# Patient Record
Sex: MALE | Race: WHITE | NOT HISPANIC OR LATINO | Employment: FULL TIME
[De-identification: names, ages, dates, MRNs, and addresses within clinical notes are randomized per-mention and may not be internally consistent; named-entity substitution may affect disease eponyms.]

---
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
## 2021-05-31 VITALS — WEIGHT: 171.74 LBS | HEIGHT: 71 IN | BODY MASS INDEX: 24.04 KG/M2

## 2021-06-12 NOTE — PROGRESS NOTES
Roomed by: Ines     Accompanied by Mother        Vitals:    08/22/17 1355   BP: 120/60   Pulse: 69   Temp: 97.2  F (36.2  C)       Chief Complaint   Patient presents with     Concerns     Possible ADD        HPI:    Mother says that father was diagnosed with ADD in the eighth grade.  He has tried numerous medications.  Last border they tried several different medications.  Medications may work for a while and then they stop.    He saw Dr. Baker who then referred him here.    He will be a senior this fall.    He was on Concerta for quite a while.  He says he does not think that it ever worked up.  Then he was on Adderall for a long time.  It worked for a long time but he turned purple when he got a cold and had problems with weight loss.    He also was on fluoxetine.  Then on Lexapro.  One made him be pissed off all the time so it was stopped.  There were concerns for depression.    When he was on fluoxetine he was also on other medications.  The other medication made him have a short fuse.    Dr. Mejia it has been the person prescribing medications.    The Adderall let him be focused.  After year he had weird side effects.    He has had issues with depression in the last couple of years.  He has been worrying too much.  He used to lock all the doors in the house and unplug all of the electrical cords.  He fell down about.  Fell for parent.    He was on fluoxetine for 1 month and stopped because of summer.    He said issues with anxiety and stress.    His grades were good but in the last quarter they were very bad.    He has taken one ACT test .  He was unable to finish it secondary to distraction.    If he is not on medication he does not have any problems with depression.    His anxiety was better when he was on the fluoxetine.    He has problems with anxiety if he has a lot of things that he needs to do or if he is out in the woods.    He only does homework when he has it between the hours of 11:30 PM and 2  AM.    He stopped Adderall during spring break.  He stopped it because he turned blue with a cold.  He did not eat.  He had a dry mouth.  He felt depressed.    He says he tried to more medications before being on fluoxetine.    He tried Vyvanse and his skin did not turn color.    On the fluoxetine his anxiety went away.  However, he could not pay attention.    He does not think that he has ever been on guanfacine.    Mother thinks the progression of medications was from Adderall and then to Vyvanse and then to Lexapro and then fluoxetine.    During this time his grades declined.  He got behind in his homework and tried to catch up.    While on the fluoxetine he was on another medication.  Not sure the name.  He was on this other medicine for 3 days.    His diagnosis of ADHD was made through a psychologist in eighth grade.  Mother does not know the name of the psychologist.        Past medical history: No serious illnesses.    Social history: Family unit consists of mother, father and a 16-year-old brother.  Ortiz was breaking up with someone in the left [employer] school.  He says however this only bothered him for a couple of days.    Family history: Mother and paternal aunt have attention problems.  No family history of heart rhythm problems or pacemaker.    Except for mentioned above complete review of systems is negative.    DSM-V criteria for ADHD were reviewed.   All yes answers in the area of inattention.  4 yes answers in the area of hyperactivity and impulsivity.    PHQ 9 score is 16    Hebert 7 score is 16          ================================    Physical Exam:    General Appearance:   Alert, NAD            Assessment:    1. ADHD (attention deficit hyperactivity disorder), inattentive type        Plan: See Patient Instructions.    No medications were ordered this encounter      Patient Instructions   As we got to the end of the visit it was not possible to tell exactly which medications he was on at which  time and caused which problems.    Mother asked if I would like to see his records from Dr. James and I told her I definitely would.    She said that she will take care of getting the records sent to the office.    Once I review these records then I will get back with her and discuss possibilities for treatment.    I spent 45 minutes with the patient and parent.  Greater than 50% spent in counseling.

## 2021-06-12 NOTE — PROGRESS NOTES
This will be a summary of the clinic note from his visit at [facility].    April 21, 2017: He had been on fluoxetine but did not feel like it was helpful.  His mood felt his mood was better within 4-5 days but was unable to focus or concentrate at school so stopped it.  He restarted Adderall.  He had problems with poor appetite.  Did not feel like it helped with his attention.  Fluoxetine was restarted for his mood.  He was started on Focalin 10 mg daily.    April 5, 2017: Phone conversation.  Medication not helpful.  He was restarted on Adderall.    March 2, 2017: Long-standing signs and symptoms of depression, anxiety.  Have worsened recently.  Grades have declined due to low mood.  Difficulty concentrating.  Distractibility.   Ortiz reported feeling anxious, worrying, restless, feeling down.  Angry outbursts, yells, swears, break things.  He had tried citalopram and sertraline in the past.  He felt ill on the medication.  Fluoxetine had been prescribed but he never took it regularly.  He was treated with trazodone for sleep.  Fluoxetine was also prescribed.    January 30, 2017:   He had been on albuterol until the last month when he was switched to Concerta.  He did not like how he felt when he is on the Adderall, said it made him feel tired.    He tried Concerta 36 mg twice daily with that it did not help, did not notice any improvement in ADD symptoms.  He was switched to Vyvanse.  Ritalin tablets in the afternoon when needed.      December 7, 2016:   On Adderall X are 20 mg daily.  He reported the Adderall helps him focus but he does not like taking it.  It makes him more tired.  From father wanted to try Concerta again it was prescribed.  Concerta 36 mg, 1 tablet 2 times a day.    February 5, 2016.   Had switched to Adderall in December and was noted to be doing well.  He had less side effects than with conservative.  Continues to have anxiety.  He had tried Celexa for a few days but it made him  too tired so he did not continue on it.  He was started on Zoloft to see if this would help with his anxiety.    December 30, 2015.   He was taking Celexa 10 mg tablets and Concerta 36 mg tablets.  Adderall dose was increased.    September 14, 2015:   Concerned with anxiety and mood swings and anger outbursts.  He did not feel any different off of the Concerta, anxiety stayed the same.  Did not feel the Concerta was helpful.  He was started on Celexa.  Continue on Concerta.    March 18, 2015:   Taking Concerta 36 mg daily.  It helps with attention and focus.  Wears off around 2 PM.  He does homework after school or in the evening.  He struggles to get homework done sometimes.  He feels okay on the medicine.  ================================================      Review of the outside records shows that he has never been on guanfacine.    At one time or another he has been on Concerta, Adderall, Focalin, Celexa, Vyvanse, fluoxetine, trazodone for sleep, sertraline.

## 2021-06-16 PROBLEM — F90.0 ADHD (ATTENTION DEFICIT HYPERACTIVITY DISORDER), INATTENTIVE TYPE: Status: ACTIVE | Noted: 2017-09-10

## 2021-10-11 ENCOUNTER — APPOINTMENT (OUTPATIENT)
Dept: URBAN - METROPOLITAN AREA CLINIC 253 | Age: 21
Setting detail: DERMATOLOGY
End: 2021-10-14

## 2021-10-11 VITALS — HEIGHT: 71 IN | WEIGHT: 173 LBS | RESPIRATION RATE: 14 BRPM

## 2021-10-11 DIAGNOSIS — L65.9 NONSCARRING HAIR LOSS, UNSPECIFIED: ICD-10-CM

## 2021-10-11 PROCEDURE — OTHER VENIPUNCTURE: OTHER

## 2021-10-11 PROCEDURE — OTHER COUNSELING: OTHER

## 2021-10-11 PROCEDURE — 99202 OFFICE O/P NEW SF 15 MIN: CPT | Mod: 25

## 2021-10-11 PROCEDURE — OTHER ADDITIONAL NOTES: OTHER

## 2021-10-11 PROCEDURE — 36415 COLL VENOUS BLD VENIPUNCTURE: CPT

## 2021-10-11 PROCEDURE — OTHER ORDER TESTS: OTHER

## 2021-10-11 ASSESSMENT — LOCATION SIMPLE DESCRIPTION DERM: LOCATION SIMPLE: POSTERIOR SCALP

## 2021-10-11 ASSESSMENT — LOCATION ZONE DERM: LOCATION ZONE: SCALP

## 2021-10-11 ASSESSMENT — LOCATION DETAILED DESCRIPTION DERM: LOCATION DETAILED: POSTERIOR MID-PARIETAL SCALP

## 2021-10-11 NOTE — PROCEDURE: VENIPUNCTURE
Venipuncture Paragraph: An alcohol pad was applied to the venipuncture site. Venipuncture was performed using a butterfly needle. Pressure and a bandaid was applied to the site. No complications were noted.
Detail Level: None
Bill 38137 For Specimen Handling/Conveyance To Laboratory?: no
Number Of Tubes Drawn: 1

## 2021-10-11 NOTE — PROCEDURE: ADDITIONAL NOTES
Render Risk Assessment In Note?: no
Detail Level: Zone
Additional Notes: Discussed telogen effluvium due to significant weight loss vs androgenic alopecia. Briefly discussed propecia \\nPt has had iron, CBC, and iron checked, unsure about Ferritin. Pt will sign release of records form and will have the clinic fax over his lab results and will check ferritin today. May consider propecia if labs are WNL

## 2021-10-11 NOTE — PROCEDURE: ORDER TESTS
Bill For Surgical Tray: no
Unknown; pt is an unreliable historian and no family/caregivers present
Billing Type: Third-Party Bill
Expected Date Of Service: 10/11/2021

## 2021-10-11 NOTE — HPI: HAIR LOSS
How Did The Hair Loss Occur?: sudden in onset
Additional History: He has noticed more loss in the shower with shampooing. \\n\\nHe has lost 38 pounds within the 4 months. He used a keto diet for a while and not maintains a high protein diet. He has been exercising 6 days per week. \\n\\nNo recent illness. He has experienced a high amount of stress and anxiety over the years. He does take an anti anxiety medication, but stopped about a year ago. He has been taking medication for anxiety for 4 years. \\n\\nHe had lab work drawn about a week ago, his thyroid, blood counts were normal. They are not sure if his iron or ferritin levels were checked.\\n\\nHis grandpa and uncle on his Dad’s side of the family have thinning hair.

## 2021-10-13 ENCOUNTER — RX ONLY (RX ONLY)
Age: 21
End: 2021-10-13

## 2021-10-13 RX ORDER — FINASTERIDE 5 MG/1
TABLET, FILM COATED ORAL
Qty: 24 | Refills: 0 | Status: CANCELLED | COMMUNITY
Start: 2021-10-13

## 2021-10-14 ENCOUNTER — RX ONLY (RX ONLY)
Age: 21
End: 2021-10-14

## 2021-10-14 RX ORDER — FINASTERIDE 5 MG/1
TABLET, FILM COATED ORAL
Qty: 24 | Refills: 0 | Status: ERX

## 2022-06-02 ENCOUNTER — LAB REQUISITION (OUTPATIENT)
Dept: LAB | Facility: CLINIC | Age: 22
End: 2022-06-02
Payer: COMMERCIAL

## 2022-06-02 DIAGNOSIS — R10.9 UNSPECIFIED ABDOMINAL PAIN: ICD-10-CM

## 2022-06-02 LAB
ANION GAP SERPL CALCULATED.3IONS-SCNC: 10 MMOL/L (ref 5–18)
BUN SERPL-MCNC: 18 MG/DL (ref 8–22)
CALCIUM SERPL-MCNC: 9.5 MG/DL (ref 8.5–10.5)
CHLORIDE BLD-SCNC: 104 MMOL/L (ref 98–107)
CO2 SERPL-SCNC: 26 MMOL/L (ref 22–31)
CREAT SERPL-MCNC: 1.11 MG/DL (ref 0.7–1.3)
GFR SERPL CREATININE-BSD FRML MDRD: >90 ML/MIN/1.73M2
GLUCOSE BLD-MCNC: 87 MG/DL (ref 70–125)
POTASSIUM BLD-SCNC: 4.5 MMOL/L (ref 3.5–5)
SODIUM SERPL-SCNC: 140 MMOL/L (ref 136–145)

## 2022-06-02 PROCEDURE — 80048 BASIC METABOLIC PNL TOTAL CA: CPT | Mod: ORL | Performed by: FAMILY MEDICINE

## 2022-10-17 ENCOUNTER — LAB REQUISITION (OUTPATIENT)
Dept: LAB | Facility: CLINIC | Age: 22
End: 2022-10-17
Payer: COMMERCIAL

## 2022-10-17 DIAGNOSIS — Z13.220 ENCOUNTER FOR SCREENING FOR LIPOID DISORDERS: ICD-10-CM

## 2022-10-17 DIAGNOSIS — N52.9 MALE ERECTILE DYSFUNCTION, UNSPECIFIED: ICD-10-CM

## 2022-10-17 PROCEDURE — 84443 ASSAY THYROID STIM HORMONE: CPT | Mod: ORL | Performed by: FAMILY MEDICINE

## 2022-10-17 PROCEDURE — 84403 ASSAY OF TOTAL TESTOSTERONE: CPT | Mod: ORL | Performed by: FAMILY MEDICINE

## 2022-10-17 PROCEDURE — 80061 LIPID PANEL: CPT | Mod: ORL | Performed by: FAMILY MEDICINE

## 2022-10-17 PROCEDURE — 84270 ASSAY OF SEX HORMONE GLOBUL: CPT | Mod: ORL | Performed by: FAMILY MEDICINE

## 2022-10-17 PROCEDURE — 80053 COMPREHEN METABOLIC PANEL: CPT | Mod: ORL | Performed by: FAMILY MEDICINE

## 2022-10-18 LAB — TSH SERPL DL<=0.005 MIU/L-ACNC: 1.38 UIU/ML (ref 0.3–4.2)

## 2022-10-19 LAB
ALBUMIN SERPL BCG-MCNC: 4.9 G/DL (ref 3.5–5.2)
ALP SERPL-CCNC: 76 U/L (ref 40–129)
ALT SERPL W P-5'-P-CCNC: 33 U/L (ref 10–50)
ANION GAP SERPL CALCULATED.3IONS-SCNC: 17 MMOL/L (ref 7–15)
AST SERPL W P-5'-P-CCNC: 24 U/L (ref 10–50)
BILIRUB SERPL-MCNC: 0.3 MG/DL
BUN SERPL-MCNC: 22.8 MG/DL (ref 6–20)
CALCIUM SERPL-MCNC: 10.1 MG/DL (ref 8.6–10)
CHLORIDE SERPL-SCNC: 101 MMOL/L (ref 98–107)
CHOLEST SERPL-MCNC: 185 MG/DL
CREAT SERPL-MCNC: 1.18 MG/DL (ref 0.67–1.17)
DEPRECATED HCO3 PLAS-SCNC: 21 MMOL/L (ref 22–29)
GFR SERPL CREATININE-BSD FRML MDRD: 89 ML/MIN/1.73M2
GLUCOSE SERPL-MCNC: 90 MG/DL (ref 70–99)
HDLC SERPL-MCNC: 83 MG/DL
LDLC SERPL CALC-MCNC: 83 MG/DL
NONHDLC SERPL-MCNC: 102 MG/DL
POTASSIUM SERPL-SCNC: 4.5 MMOL/L (ref 3.4–5.3)
PROT SERPL-MCNC: 7.4 G/DL (ref 6.4–8.3)
SHBG SERPL-SCNC: 45 NMOL/L (ref 11–80)
SODIUM SERPL-SCNC: 139 MMOL/L (ref 136–145)
TRIGL SERPL-MCNC: 95 MG/DL

## 2022-10-20 LAB
TESTOST FREE SERPL-MCNC: 10.47 NG/DL
TESTOST SERPL-MCNC: 591 NG/DL (ref 240–950)

## 2022-10-25 ENCOUNTER — LAB REQUISITION (OUTPATIENT)
Dept: LAB | Facility: CLINIC | Age: 22
End: 2022-10-25
Payer: COMMERCIAL

## 2022-10-25 DIAGNOSIS — N34.2 OTHER URETHRITIS: ICD-10-CM

## 2022-10-25 PROCEDURE — 87389 HIV-1 AG W/HIV-1&-2 AB AG IA: CPT | Mod: ORL | Performed by: FAMILY MEDICINE

## 2022-10-25 PROCEDURE — 86780 TREPONEMA PALLIDUM: CPT | Mod: ORL | Performed by: FAMILY MEDICINE

## 2022-10-25 PROCEDURE — 87591 N.GONORRHOEAE DNA AMP PROB: CPT | Mod: ORL | Performed by: FAMILY MEDICINE

## 2022-10-25 PROCEDURE — 87086 URINE CULTURE/COLONY COUNT: CPT | Mod: ORL | Performed by: FAMILY MEDICINE

## 2022-10-27 LAB
C TRACH DNA SPEC QL PROBE+SIG AMP: NEGATIVE
HIV 1+2 AB+HIV1 P24 AG SERPL QL IA: NONREACTIVE
N GONORRHOEA DNA SPEC QL NAA+PROBE: NEGATIVE
T PALLIDUM AB SER QL: NONREACTIVE

## 2022-10-28 LAB — BACTERIA UR CULT: NO GROWTH

## 2022-12-06 ENCOUNTER — LAB REQUISITION (OUTPATIENT)
Dept: LAB | Facility: CLINIC | Age: 22
End: 2022-12-06
Payer: COMMERCIAL

## 2022-12-06 DIAGNOSIS — N34.2 OTHER URETHRITIS: ICD-10-CM

## 2022-12-06 PROCEDURE — 87591 N.GONORRHOEAE DNA AMP PROB: CPT | Mod: ORL | Performed by: FAMILY MEDICINE

## 2022-12-06 PROCEDURE — 87491 CHLMYD TRACH DNA AMP PROBE: CPT | Mod: ORL | Performed by: FAMILY MEDICINE

## 2022-12-06 PROCEDURE — 87086 URINE CULTURE/COLONY COUNT: CPT | Mod: ORL | Performed by: FAMILY MEDICINE

## 2022-12-08 LAB
C TRACH DNA SPEC QL PROBE+SIG AMP: NEGATIVE
N GONORRHOEA DNA SPEC QL NAA+PROBE: NEGATIVE

## 2022-12-09 LAB — BACTERIA UR CULT: NO GROWTH

## 2023-01-27 ENCOUNTER — HOSPITAL ENCOUNTER (EMERGENCY)
Facility: CLINIC | Age: 23
Discharge: HOME | End: 2023-01-27
Attending: EMERGENCY MEDICINE | Admitting: EMERGENCY MEDICINE
Payer: COMMERCIAL

## 2023-01-27 ENCOUNTER — TELEPHONE (OUTPATIENT)
Dept: UROLOGY | Facility: CLINIC | Age: 23
End: 2023-01-27

## 2023-01-27 VITALS
DIASTOLIC BLOOD PRESSURE: 68 MMHG | RESPIRATION RATE: 16 BRPM | SYSTOLIC BLOOD PRESSURE: 124 MMHG | HEART RATE: 79 BPM | WEIGHT: 175 LBS | BODY MASS INDEX: 24.24 KG/M2 | TEMPERATURE: 98.5 F | OXYGEN SATURATION: 100 %

## 2023-01-27 DIAGNOSIS — N52.9 ERECTILE DYSFUNCTION, UNSPECIFIED ERECTILE DYSFUNCTION TYPE: ICD-10-CM

## 2023-01-27 DIAGNOSIS — K62.89 RECTAL PAIN: ICD-10-CM

## 2023-01-27 DIAGNOSIS — K59.00 CONSTIPATION, UNSPECIFIED CONSTIPATION TYPE: ICD-10-CM

## 2023-01-27 PROCEDURE — 99283 EMERGENCY DEPT VISIT LOW MDM: CPT

## 2023-01-27 ASSESSMENT — ACTIVITIES OF DAILY LIVING (ADL): ADLS_ACUITY_SCORE: 33

## 2023-01-27 NOTE — DISCHARGE INSTRUCTIONS
Statement Selected We discussed that the cause of your symptoms today is unclear.  I recommend discussing an MRI of your spine with your primary care physician, as this could be contributing to your symptoms.  I have also placed referrals for gastroenterology and urology, I for further evaluation.  At this point, do not see evidence for dangerous condition, including an abdominal infection, bowel obstruction, thrombosed hemorrhoid, rectal infection, or prostatitis.  Return to the ED for worsening pain, fevers, numbness or weakness in your legs, difficulty urinating, painful urination, or other concerns.

## 2023-01-27 NOTE — ED PROVIDER NOTES
"  History     Chief Complaint:  Abdominal Pain and Constipation       HPI   Ortiz Johns is a 22 year old male without other significant medical history, who presents with severe lower abdominal and rectal pain.  Patient states that his symptoms flared today when he was trying to have a bowel movement.  He is able to pass a minimal amount of stool, then had a severe spike in rectal and abdominal pain.  He has been seeing his primary care doctor as well as a urologist for the symptoms for several months.  He has pain that becomes so severe that he doubles over, and starts crying.  He then has periods when the pain completely goes away.  The pain seems to come and go.  At this point, he has his chronic level of pain.  He has been seeing physical therapy, as he has been diagnosed with pelvic floor dysfunction.  At times, he can wake up in the morning and noticed that his pelvic floor is very \"tight.\"  He has undergone a lot of testing, including CT abdomen pelvis, a sexually transmitted infection evaluation, urinalysis, and evaluation for probable prostatitis.  The diagnosis has been unclear.  He is also being evaluated by urology for erectile dysfunction.  He is currently on laxatives, chronically strains with stools.  He denies fevers, chills, vomiting, nausea, or anorexia.  He denies history of anal intercourse.  He has not had dysuria, but occasionally has symptoms of urgency, when he has to get to the bathroom right away.  No history of incontinence.  He denies any numbness or weakness to his legs.    CT a/p 1/4/23    FINDINGS:   LOWER CHEST: Normal.     HEPATOBILIARY: Normal.     PANCREAS: Normal.     SPLEEN: Normal.     ADRENAL GLANDS: Normal.     KIDNEYS/BLADDER: No significant mass, stone, or hydronephrosis.     BOWEL: Normal.     LYMPH NODES: No abdominal pelvic lymphadenopathy.     VASCULATURE: No abdominal aortic aneurysm.     PELVIC ORGANS: Mild prominence/enlargement of the prostate gland. Nothing for " prostatic abscess or periprostatic fluid collection. No free fluid. No pelvic mass.     MUSCULOSKELETAL: Normal.    Independent Historian:  yes       Review of External Notes: CT and recent PCP visit reviewed     ROS:  Review of Systems    Allergies:  No Known Allergies     Medications:    guanFACINE 1 mg Tb24      Past Medical History:    Pelvic floor dysfunction  Erectile dysfunction  Anxiety      Past Surgical History:    No past surgical history on file.     Family History:    family history is not on file.    Social History:   reports that he has never smoked. He has never used smokeless tobacco. He reports that he does not drink alcohol and does not use drugs.  PCP: [facility]     Physical Exam   Patient Vitals for the past 24 hrs:   BP Temp Temp src Pulse Resp SpO2 Weight   01/27/23 1134 (!) 133/92 98.5  F (36.9  C) Temporal 67 18 99 % 79.4 kg (175 lb)        Physical Exam  General: alert, seated on chair  HENT: mucous membranes moist  CV: regular rate, regular rhythm  Resp: normal effort, clear throughout, no crackles or wheezing  GI: abdomen with generalized voluntary guarding but no rebound.  No focal tenderness.  Rectal: No stool in the vault, Normal, non-tender prostate. Non-tender rectal exam.  No anal fissure or external hemorrhoid.  No rectal rash or other lesion.   MSK: no bony tenderness  Skin: appropriately warm and dry  Extremities: no edema, calves non-tender  Neuro: alert, clear speech, oriented  Psych: normal mood and affect      Emergency Department Course     Emergency Department Course & Assessments:             Interventions:  Medications - No data to display     Independent Interpretation (X-rays, CTs, rhythm strip):  none     Consultations/Discussion of Management or Tests:  none        Social Determinants of Health affecting care:  none       Assessments:    Disposition:  The patient was discharged to home.     Impression & Plan        Medical Decision Making:  Ortiz BRUMFIELD  "Nat is a 22 year old male with history of recent diagnosed pelvic floor dysfunction, who presents with increased rectal pain after attempting to have a bowel movement.  Exam negative for anal fissure, thrombosed hemorrhoid, prostatitis, proctitis, elvis-rectal abscess, or bill gangrene.  He has diffuse voluntary guarding during abdominal exam, but does not have peritonitis as he is not particularly tender with palpation.  Normal movement of lower extremities, no reported paresthesias.  He has already had extensive conventional work up with CT, UA, STI evaluation.  At this point, we agree that repeating those labs will not likely be helpful.  I recommend outpatient evaluation with MRI, given concurrent erectile dysfunction, urinary urgency, and pelvic floor \"tightness\".  I have also placed referrals to urology and GI.  Return to the ED for fever, discharge, increased pain, or other concerns.    Diagnosis:    ICD-10-CM    1. Rectal pain  K62.89 Adult GI  Referral - Consult Only     Adult Urology  Referral      2. Constipation, unspecified constipation type  K59.00 Adult GI  Referral - Consult Only      3. Erectile dysfunction, unspecified erectile dysfunction type  N52.9 Adult Urology  Referral           1/27/2023   Guillermina Bell MD Pepper, Tracy Lynn, MD  01/27/23 2857    "

## 2023-01-27 NOTE — TELEPHONE ENCOUNTER
[facility] Call Center    Phone Message    May a detailed message be left on voicemail: yes     Reason for Call: Other: Patient is being referred to Urology for an urgent appointment in 3-5 ays. Please triage and call patient to schedule      Action Taken: Message routed to:  [facility] ([facility]): Urology     Travel Screening: Not Applicable

## 2023-01-27 NOTE — ED TRIAGE NOTES
Pt here for rectal and abdominal pain. Feels he is unable to fully pass BMs. Last BM this morning and has intense pain since. Does have a hx of hemorrhoids and pelvic floor dysfunction. Has been taking stool softeners regularly.       
Ambulatory

## 2023-01-30 NOTE — TELEPHONE ENCOUNTER
REFERRAL INFORMATION:    Referring Provider:  Guillermina Bell MD    Referring Clinic:  [facility]  Reason for Visit/Diagnosis:   Rectal pain   Constipation, unspecified constipation type      FUTURE VISIT INFORMATION:    Appointment Date: 2/17/2023     NOTES STATUS DETAILS   OFFICE NOTE from Referring Provider N/A    OFFICE NOTE from Other Specialist N/A    HOSPITAL DISCHARGE SUMMARY/  ED VISITS Internal 1/27/2023 [facility] ED   OPERATIVE REPORT N/A    MEDICATION LIST Internal         ENDOSCOPY  N/A    COLONOSCOPY N/A    ERCP N/A    EUS N/A    STOOL TESTING N/A    PERTINENT LABS N/A    PATHOLOGY REPORTS (RELATED) N/A    IMAGING (CT, MRI, EGD, MRCP, Small Bowel Follow Through/SBT, MR/CT Enterography) Recieved 1/4/2023 CT Abdomen pelvis      Request sent to Joe Fax :   [phone]    IMAGING IN PACS

## 2023-02-17 ENCOUNTER — PRE VISIT (OUTPATIENT)
Dept: GASTROENTEROLOGY | Facility: CLINIC | Age: 23
End: 2023-02-17

## 2023-02-17 ENCOUNTER — VIRTUAL VISIT (OUTPATIENT)
Dept: GASTROENTEROLOGY | Facility: CLINIC | Age: 23
End: 2023-02-17
Payer: COMMERCIAL

## 2023-02-17 DIAGNOSIS — K59.00 CONSTIPATION, UNSPECIFIED CONSTIPATION TYPE: ICD-10-CM

## 2023-02-17 DIAGNOSIS — K21.9 GASTROESOPHAGEAL REFLUX DISEASE, UNSPECIFIED WHETHER ESOPHAGITIS PRESENT: Primary | ICD-10-CM

## 2023-02-17 DIAGNOSIS — K62.89 RECTAL PAIN: ICD-10-CM

## 2023-02-17 PROCEDURE — 99205 OFFICE O/P NEW HI 60 MIN: CPT | Mod: VID | Performed by: PHYSICIAN ASSISTANT

## 2023-02-17 ASSESSMENT — PATIENT HEALTH QUESTIONNAIRE - PHQ9: SUM OF ALL RESPONSES TO PHQ QUESTIONS 1-9: 14

## 2023-02-17 NOTE — PROGRESS NOTES
GI CLINIC VISIT    CC/REFERRING MD:  Guillermina Bell  REASON FOR CONSULTATION: Rectal pain, constipation    ASSESSMENT/PLAN:  21 y/o M with pmhx of anxiety, depression, hx of pelvic floor dysfunction presents for evaluation of rectal pain and constipation.     #Constipation, rectal pain, ?Hemorrhoids/anal fissure   Patient reports that as a child he had a long history of constipation and states that he has previously been on medications for constipation.  He does not know significant issues as he transition from adolescence into adulthood but states that for the last year he has had significant issues with rectal pain.  He has pain typically with having a bowel movement as well as pain intermittently in his rectum area.  He does note that generally he has issues with constipation although he typically does have a bowel movement daily but at times they can be quite hard to pass and he has significant straining and pushing whenever he has a bowel movement.  He has also noticed intermittent episodes of bright red blood on the toilet paper when he wipes after having a bowel movement for the last year.  He believes he may have internal hemorrhoids.  He states that he is also being evaluated by a physical therapist for pelvic floor dysfunction.  He does note to me that he does not feel that he evacuates completely when defecating.  It is possible that he has an anal fissure and likely internal hemorrhoids, as a cause of the intermittent blood that he does see when wiping.  This may explain some of his rectal pain as well when he is having a bowel movement. He does note that symptoms at time worsening with stress so we did discuss the Gut-Brain Axis.  At this time we will plan to treat his constipation symptoms, but will obtain celiac serologies and thyroid testing to rule these out as possible etiologies.  Discussed starting MiraLAX daily, 1 capful and he can uptitrate this.  He did tell me that he did take MiraLAX for  a few days and did feel like his bowel movements were more regular.  I also discussed starting a supplemental soluble fiber, 2 weeks after starting MiraLAX but did discuss with him that this would cause increased bulk to his stools as well so it may be beneficial that he start with only half a tablespoon and then uptitrate this.  He is currently following with a physical therapist for pelvic floor dysfunction, but I will place a referral to our pelvic floor center as they may better determine if you would be a good candidate for biofeedback therapy.  --obtain labs: TTg, IgA, TSH  -- Please start MiraLAX daily, 1 capful, you can uptitrate this to 3 capfuls daily  --after being on miralax for two weeks, please start a supplemental soluble fiber  --review constipation plan.   --pelvic floor center referral placed.  --future considerations: colorectal surgery referral for anoscopic evaluation.    #GERD  Patient does report some symptoms of heartburn that he states occurs usually 1-2 times a week.  He does notice this more when he is eating spicy foods.  Discussed with patient that he can try taking Pepcid or Tums as needed and he should try to review avoid any trigger foods.  --pepcid or TUMS PRN.       RTC 6-8 weeks, or sooner.     Thank you for this consultation.  It was a pleasure to participate in the care of this patient; please contact us with any further questions.       60 minutes spent on the date of the encounter doing chart review, patient visit and documentation    This note was created with voice recognition software, and while reviewed for accuracy, typos may remain.    Yue Plascencia PA-C  Division of Gastroenterology, Hepatology and Nutrition  [facility]  21 y/o M with pmhx of anxiety, depression, hx of pelvic floor dysfunction presents for evaluation of rectal pain and constipation.    Patient reports that he has had rectal pain for over a year now  "-- reports he has pain typically with having a BM and every now and then.     He reports as a child he had a history of constipation -- states that he was on multiple different medications for this. States that as he transitioned into adolescence into adulthood, he did not really have issues with constipation. He generally has a bowel movement daily, generally once. He states when he as a \"good week\", states stool is a bristol type 2 or 3, and will typically have to strain and push to have a movement. During a \"bad week\", stool is pebble-like and hard. Patient has tried miralax for constipation, which he tried a couple of weeks ago -- he took it for 3 days. While he was taking this, he felt that his stools were a bristol type III or IV. Does report blood in his stool which he noticed a year ago, he notices it on the toilet paper when he wipes, but not mixed into the stool. He states this happens every once in a while, usually when he is passing hard stools. He denies any known hx of hemorrhoids - but believes he may have this. He does endorse abdominal pain that is intermittent in nature, typically worse when he feels constipated. He does notice  He has had some increased gas in the last year -- which he states is new for him. He does report feeling bloated. Denies early satiety or post prandial fullness. No nausea or vomiting. Denies any abdominal surgeries. He has noticed that symptoms generally worsen with stress.     Does report GERD symptoms -- which he describes as \"heartburn\". Patient notices it after eating spicy foods, citrus drinks. Generally experiences GERD symptoms 1-2x/week. Sometimes when he drinks water, that pain will resolve.     Does report alcohol use - 10 drinks on Fridays and saturdays. Denies tobacco use. Denies any illicit drug use. Denies frequent NSAID use.     No family hx of colon cancer.   Unsure if he has a family hx of IBD.     Denies f/c, chest pain or SOB, odynophagia, or dysphagia, " or unintentional weight loss.     Patient has been following with a physical therapist for pelvic floor dysfunction, but states he has not any particular testing done.   He is currently taking sertraline for depression and anxiety.    ROS:    No fevers or chills  No weight loss  No shortness of breath or wheezing  No chest pain or pressure  No odynophagia or dysphagia  No BRBPR, hematochezia, melena  No dysuria, frequency or urgency      PROBLEM LIST  Patient Active Problem List    Diagnosis Date Noted     ADHD (attention deficit hyperactivity disorder), inattentive type 09/10/2017     Priority: Medium       PERTINENT PAST MEDICAL HISTORY:  Depression  Anxiety  No past medical history on file.    PREVIOUS SURGERIES:  None.  No past surgical history on file.    PREVIOUS ENDOSCOPY:  None.    ALLERGIES:   No Known Allergies    PERTINENT MEDICATIONS:    Current Outpatient Medications:      guanFACINE 1 mg Tb24, [GUANFACINE 1 MG TB24] Take 1 tablet (1 mg total) by mouth at bedtime., Disp: 30 tablet, Rfl: 0     sertraline (ZOLOFT) 50 MG tablet, Take 50 mg by mouth daily, Disp: , Rfl:     SOCIAL HISTORY:    Social History     Socioeconomic History     Marital status: Single     Spouse name: Not on file     Number of children: Not on file     Years of education: Not on file     Highest education level: Not on file   Occupational History     Not on file   Tobacco Use     Smoking status: Never     Smokeless tobacco: Never   Substance and Sexual Activity     Alcohol use: No     Drug use: No     Sexual activity: Not on file   Other Topics Concern     Not on file   Social History Narrative     Not on file     Social Determinants of Health     Financial Resource Strain: Not on file   Food Insecurity: Not on file   Transportation Needs: Not on file   Physical Activity: Not on file   Stress: Not on file   Social Connections: Not on file   Intimate Partner Violence: Not on file   Housing Stability: Not on file       FAMILY  HISTORY:  FH of CRC: None.  FH of IBD: Unknown.  No family history on file.    Past/family/social history reviewed and no changes    PHYSICAL EXAMINATION:  General: Patient appears well in no acute distress.   Skin: No visualized rash or lesions on visualized skin  Eyes: EOMI, no erythema, sclera icterus or discharge noted  Resp: Appears to be breathing comfortably without accessory muscle usage, speaking in full sentences, no cough  MSK: Appears to have normal range of motion based on visualized movements  Neurologic: No apparent tremors, facial movements symmetric  Psych: normal affect , alert and oriented        PERTINENT STUDIES:  CT Abd/pelvis (1/4/23):   1.  Mild prominence/enlargement of the prostate gland. Nothing for prostatic abscess.   2.  CT abdomen pelvis is otherwise negative.          Video-Visit Details    Video Start Time: 11:30 AM    Type of service:  Video Visit    Video End Time:11:54 AM    Originating Location (pt. Location): Home        Distant Location (provider location):  Off-site    Platform used for Video Visit: Well

## 2023-02-17 NOTE — PROGRESS NOTES
Is the patient currently in the state of [place]? YES    Visit mode:VIDEO    If the visit is dropped, the patient can be reconnected by: VIDEO VISIT: Text to cell phone: [phone]    Will anyone else be joining the visit? No      How would you like to obtain your AVS? Mail a copy    Are changes needed to the allergy or medication list? NO    Comments or concerns regarding today's visit: NO     Video-Visit Details    Type of service:  Video Visit    Video Start Time (time video started): 11:30AM    Video End Time (time video stopped): 11:54 AM    Originating Location (pt. Location): Home    Distant Location (provider location):  Off-site    Mode of Communication:  Video Conference via [employer]

## 2023-02-17 NOTE — PATIENT INSTRUCTIONS
It was a pleasure taking care of you today.  I've included a brief summary of our discussion and care plan from today's visit below.  Please review this information with your primary care provider.  ______________________________________________________________________    My recommendations are summarized as follows:  --please obtain labs, you can go to any [facility] to have this done    Goal: Improvement in stool frequency to reduce occurrence of straining, bloating, and abdominal discomfort.    Daily constipation plan:  -- Miralax: Miralax 1 capful daily, titrating to goal, up to 3 capfuls daily. If you have not had a bowel movement for 2-3 days, or if you feel that you straining, incompletely evacuating, plan to augment your daily plan by increasing Miralax dose, up to 3 capfuls daily, until stooling as resumed, and then return to previous dosing.  -- Increase dietary fiber as able to tolerate, with goal 20-30 grams daily. Prunes can be a great source of fiber and can be used daily to help with bowel movements. If certain foods are difficult for you to tolerate, consider meeting with our nutrition team to help you.  -- Fiber: After using Miralax for about 2 weeks, you can add in soluble fiber supplement (such as Citrucel, Metamucil, psyllium husk). Start with 1/2 tablespoon in 8 oz water daily, increase slowly to effect. A good goal is 1 tablespoon daily, with a maximum of 3 tablespoons daily. This helps with stool consistency.  - Try to stay active with at least 150 minutes of moderate intensity activity per week. This can include brisk walking, active gardening, cycling, yoga, pilates, etc.   - Try to stay hydrated, goal 48-64 oz of non-caffeinated fluid daily    --I know you are following with a therapist for pelvic floor dysfunction, as we discussed there are some further tests that can be done to see if you are a good candidate for biofeedback therapy, so I have placed a referral to our pelvic  [facility] as well if you would like to follow up there.  --in regards to your acid reflux symptoms, you can take pepcid or tums as needed, as it seems symptoms are only occurring 1-2x/week. I would try to limited spicy foods, citrus, caffeine, chocolate etc.       -- please see scheduling information provided below     Return to GI Clinic in 6-8 weeks to review your progress. ______________________________________________________________________    How do I schedule labs, imaging studies, or procedures that were ordered in clinic today?     Labs: To schedule lab appointment at the [facility], use my chart or call [phone]. If you have a [place] lab closer to home where you are regularly seen you can give them a call.     Procedures: If a colonoscopy, upper endoscopy, breath test, esophageal manometry, or pH impedence was ordered today, our endoscopy team will call you to schedule this. If you have not heard from our endoscopy team within a week, please call [phone] to schedule.     Imaging Studies: If you were scheduled for a CT scan, X-ray, MRI, ultrasound, HIDA scan or other imaging study, please call [phone] to have this scheduled.     Referral: If a referral to another specialty was ordered, expect a phone call or follow instructions above. If you have not heard from anyone regarding your referral in a week, please call our clinic to check the status.     Who do I call with any questions after my visit?  Please be in touch if there are any further questions that arise following today's visit.  There are multiple ways to contact your gastroenterology care team.      During business hours, you may reach a Gastroenterology nurse at [phone]    To schedule or reschedule an appointment, please call [phone].     You can always send a secure message through mPort.  mPort messages are answered by your nurse or doctor typically within 24  hours.  Please allow extra time on weekends and holidays.      For urgent/emergent questions after business hours, you may reach the on-call GI Fellow by contacting the [facility]  at [phone].     How will I get the results of any tests ordered?    You will receive all of your results.  If you have signed up for Mengerohart, any tests ordered at your visit will be available to you after your provider reviews them.  Typically this takes 1-2 weeks.  If there are urgent results that require a change in your care plan, your provider or nurse will call you to discuss the next steps.      What is Mengerohart?  ishBowl is a secure way for you to access all of your healthcare records from the [facility].  It is a web based computer program, so you can sign on to it from any location.  It also allows you to send secure messages to your care team.  I recommend signing up for ishBowl access if you have not already done so and are comfortable with using a computer.      How to I schedule a follow-up visit?  If you did not schedule a follow-up visit today, please call [phone] to schedule a follow-up office visit.      Sincerely,    Yue Plascencia PA-C  Division of Gastroenterology, Hepatology & Nutrition  [facility]

## 2023-03-01 ENCOUNTER — VIRTUAL VISIT (OUTPATIENT)
Dept: UROLOGY | Facility: CLINIC | Age: 23
End: 2023-03-01
Payer: COMMERCIAL

## 2023-03-01 VITALS — BODY MASS INDEX: 24.5 KG/M2 | WEIGHT: 175 LBS | HEIGHT: 71 IN

## 2023-03-01 DIAGNOSIS — M62.89 PELVIC FLOOR DYSFUNCTION: ICD-10-CM

## 2023-03-01 DIAGNOSIS — N41.0 ACUTE PROSTATITIS: Primary | ICD-10-CM

## 2023-03-01 DIAGNOSIS — R35.0 URINARY FREQUENCY: ICD-10-CM

## 2023-03-01 PROCEDURE — 99204 OFFICE O/P NEW MOD 45 MIN: CPT | Mod: VID | Performed by: PHYSICIAN ASSISTANT

## 2023-03-01 ASSESSMENT — PAIN SCALES - GENERAL: PAINLEVEL: NO PAIN (0)

## 2023-03-01 NOTE — PROGRESS NOTES
Ortiz is a 22 year old who is being evaluated via a billable video visit.      How would you like to obtain your AVS? MyChart  If the video visit is dropped, the invitation should be resent by: Text to cell phone: [phone]  Will anyone else be joining your video visit? No    Huong Vasquez CMA      Video-Visit Details    Type of service:  Video Visit     Originating Location (pt. Location): Other car    Distant Location (provider location):  On-site  Platform used for Video Visit: [employer]     Start time: 2:30PM    CC: urinary symptoms    HPI:  Ortiz Johns is a pleasant 22 year old male who presents in consultation from Dr. Bell for evaluation of the above. Frequency, nocturia x 2-3, strong stream, rectal pain, lower back pain. Reports having chlamydia in the fall. Diffuse scrotal pain and is intermittent. No swelling or skin changes.      Saw Urology (Dr. Peña) just over a month ago and was diagnosed with pelvic floor dysfunction (Normal LEANNA/PVR).  CT in Jan was relatively unremarkable. T level was 739 (normal).    Was in the ED 1/27/23 for rectal pain with bowel movement (spasm). Constipation noted. Hx of anxiety. Admittedly, doing a lot of research to try to make sense of his symptoms.     Has seen pelvic floor PT and told his pelvic floor was tight. has done a few weekly sessions. Not sure this has helped much.  by trade. Has eliminated caffeine.   Has not had his spine evaluated.      PMH:   PFD  Anxiety    SURG: No urologic procedures    Social History     Socioeconomic History     Marital status: Single     Spouse name: Not on file     Number of children: Not on file     Years of education: Not on file     Highest education level: Not on file   Occupational History     Not on file   Tobacco Use     Smoking status: Never     Smokeless tobacco: Never   Substance and Sexual Activity     Alcohol use: No     Drug use: No     Sexual activity: Not on file   Other Topics Concern     Not on  "file   Social History Narrative     Not on file     Social Determinants of Health     Financial Resource Strain: Not on file   Food Insecurity: Not on file   Transportation Needs: Not on file   Physical Activity: Not on file   Stress: Not on file   Social Connections: Not on file   Intimate Partner Violence: Not on file   Housing Stability: Not on file       No family history on file.    No Known Allergies    Current Outpatient Medications   Medication     guanFACINE 1 mg Tb24     sertraline (ZOLOFT) 50 MG tablet     No current facility-administered medications for this visit.         PEx:   Height 1.803 m (5' 11\"), weight 79.4 kg (175 lb).    PSYCH: NAD  EYES: EOMI  MOUTH: MMM  NEURO: AAO x3    Urine:      A/P: Ortiz BRISSA Johns is a 22 year old male with pelvic floor dysfunction vs chronic prostatitis  -Semen culture to r/o bacterial prostatitis  -Continue PFPT  -CT reviewed  -Scrotal US  -If all normal, would suggest he see Ortho for spine eval and poss follow-up with Dr. Sharma for chronic pelvic pain.        Latasha Sams PA-C  [facility]        37 minutes spent on the date of the encounter doing chart review, review of outside records, review of test results, interpretation of tests, patient visit and documentation                 "

## 2023-03-01 NOTE — LETTER
3/1/2023       RE: Ortiz Johns  [address] [address]     Dear Colleague,    Thank you for referring your patient, Ortiz Johns, to the [facility] at [facility]. Please see a copy of my visit note below.    Ortiz is a 22 year old who is being evaluated via a billable video visit.      How would you like to obtain your AVS? MyChart  If the video visit is dropped, the invitation should be resent by: Text to cell phone: [phone]  Will anyone else be joining your video visit? No    Huong Vasquez CMA      Video-Visit Details    Type of service:  Video Visit     Originating Location (pt. Location): Other car    Distant Location (provider location):  On-site  Platform used for Video Visit: [employer]     Start time: 2:30PM    CC: urinary symptoms    HPI:  Ortiz Johns is a pleasant 22 year old male who presents in consultation from Dr. Bell for evaluation of the above. Frequency, nocturia x 2-3, strong stream, rectal pain, lower back pain. Reports having chlamydia in the fall. Diffuse scrotal pain and is intermittent. No swelling or skin changes.      Saw Urology (Dr. Peña) just over a month ago and was diagnosed with pelvic floor dysfunction (Normal LEANNA/PVR).  CT in Jan was relatively unremarkable. T level was 739 (normal).    Was in the ED 1/27/23 for rectal pain with bowel movement (spasm). Constipation noted. Hx of anxiety. Admittedly, doing a lot of research to try to make sense of his symptoms.     Has seen pelvic floor PT and told his pelvic floor was tight. has done a few weekly sessions. Not sure this has helped much.  by trade. Has eliminated caffeine.   Has not had his spine evaluated.      PMH:   PFD  Anxiety    SURG: No urologic procedures    Social History     Socioeconomic History     Marital status: Single     Spouse name: Not on file     Number of children: Not on file     Years of  "education: Not on file     Highest education level: Not on file   Occupational History     Not on file   Tobacco Use     Smoking status: Never     Smokeless tobacco: Never   Substance and Sexual Activity     Alcohol use: No     Drug use: No     Sexual activity: Not on file   Other Topics Concern     Not on file   Social History Narrative     Not on file     Social Determinants of Health     Financial Resource Strain: Not on file   Food Insecurity: Not on file   Transportation Needs: Not on file   Physical Activity: Not on file   Stress: Not on file   Social Connections: Not on file   Intimate Partner Violence: Not on file   Housing Stability: Not on file       No family history on file.    No Known Allergies    Current Outpatient Medications   Medication     guanFACINE 1 mg Tb24     sertraline (ZOLOFT) 50 MG tablet     No current facility-administered medications for this visit.         PEx:   Height 1.803 m (5' 11\"), weight 79.4 kg (175 lb).    PSYCH: NAD  EYES: EOMI  MOUTH: MMM  NEURO: AAO x3    Urine:      A/P: Ortiz Johns is a 22 year old male with pelvic floor dysfunction vs chronic prostatitis  -Semen culture to r/o bacterial prostatitis  -Continue PFPT  -CT reviewed  -Scrotal US  -If all normal, would suggest he see Ortho for spine eval and poss follow-up with Dr. Sharma for chronic pelvic pain.        CHAT[facility]        37 minutes spent on the date of the encounter doing chart review, review of outside records, review of test results, interpretation of tests, patient visit and documentation       "

## 2023-03-01 NOTE — PATIENT INSTRUCTIONS
Semen culture kit    Scrotal Ultrasound    Continue pelvic floor PT    Consider seeing Orthopedics to evaluate your spine as source of your pain.    If normal, I think it is worth seeking an opinion from Dr. Sharma at the [facility]. Please call [phone] to schedule an appointment.

## 2023-03-03 ENCOUNTER — TELEPHONE (OUTPATIENT)
Dept: UROLOGY | Facility: CLINIC | Age: 23
End: 2023-03-03
Payer: COMMERCIAL

## 2023-03-03 NOTE — TELEPHONE ENCOUNTER
----- Message from Lesia العلي sent at 3/2/2023  8:58 AM CST -----  Regarding: ultrasound  Semen culture kit, needs to  in [facility].   Scrotal US    Will call with results. Zachary if normal.     [facility]  3/1/23

## 2023-03-06 ENCOUNTER — LAB (OUTPATIENT)
Dept: LAB | Facility: CLINIC | Age: 23
End: 2023-03-06
Payer: COMMERCIAL

## 2023-03-06 DIAGNOSIS — N41.0 ACUTE PROSTATITIS: ICD-10-CM

## 2023-03-10 ENCOUNTER — HOSPITAL ENCOUNTER (OUTPATIENT)
Dept: ULTRASOUND IMAGING | Facility: CLINIC | Age: 23
Discharge: HOME | End: 2023-03-10
Attending: PHYSICIAN ASSISTANT | Admitting: PHYSICIAN ASSISTANT
Payer: COMMERCIAL

## 2023-03-10 DIAGNOSIS — N41.0 ACUTE PROSTATITIS: ICD-10-CM

## 2023-03-10 PROCEDURE — 93976 VASCULAR STUDY: CPT

## 2023-04-04 ENCOUNTER — APPOINTMENT (OUTPATIENT)
Dept: MRI IMAGING | Facility: CLINIC | Age: 23
End: 2023-04-04
Attending: PHYSICIAN ASSISTANT
Payer: COMMERCIAL

## 2023-04-04 ENCOUNTER — HOSPITAL ENCOUNTER (EMERGENCY)
Facility: CLINIC | Age: 23
Discharge: HOME | End: 2023-04-04
Attending: PHYSICIAN ASSISTANT | Admitting: PHYSICIAN ASSISTANT
Payer: COMMERCIAL

## 2023-04-04 VITALS
RESPIRATION RATE: 18 BRPM | OXYGEN SATURATION: 98 % | SYSTOLIC BLOOD PRESSURE: 126 MMHG | TEMPERATURE: 98.5 F | HEART RATE: 70 BPM | DIASTOLIC BLOOD PRESSURE: 69 MMHG

## 2023-04-04 DIAGNOSIS — M54.41 CHRONIC BILATERAL LOW BACK PAIN WITH BILATERAL SCIATICA: ICD-10-CM

## 2023-04-04 DIAGNOSIS — G89.29 CHRONIC BILATERAL LOW BACK PAIN WITH BILATERAL SCIATICA: ICD-10-CM

## 2023-04-04 DIAGNOSIS — R20.2 PARESTHESIAS: ICD-10-CM

## 2023-04-04 DIAGNOSIS — M54.42 CHRONIC BILATERAL LOW BACK PAIN WITH BILATERAL SCIATICA: ICD-10-CM

## 2023-04-04 LAB
ALBUMIN UR-MCNC: NEGATIVE MG/DL
ANION GAP SERPL CALCULATED.3IONS-SCNC: 10 MMOL/L (ref 7–15)
APPEARANCE UR: CLEAR
BASOPHILS # BLD AUTO: 0 10E3/UL (ref 0–0.2)
BASOPHILS NFR BLD AUTO: 1 %
BILIRUB UR QL STRIP: NEGATIVE
BUN SERPL-MCNC: 16.4 MG/DL (ref 6–20)
CALCIUM SERPL-MCNC: 9.6 MG/DL (ref 8.6–10)
CHLORIDE SERPL-SCNC: 102 MMOL/L (ref 98–107)
COLOR UR AUTO: NORMAL
CREAT SERPL-MCNC: 0.8 MG/DL (ref 0.67–1.17)
DEPRECATED HCO3 PLAS-SCNC: 26 MMOL/L (ref 22–29)
EOSINOPHIL # BLD AUTO: 0.2 10E3/UL (ref 0–0.7)
EOSINOPHIL NFR BLD AUTO: 4 %
ERYTHROCYTE [DISTWIDTH] IN BLOOD BY AUTOMATED COUNT: 12.1 % (ref 10–15)
GFR SERPL CREATININE-BSD FRML MDRD: >90 ML/MIN/1.73M2
GLUCOSE SERPL-MCNC: 89 MG/DL (ref 70–99)
GLUCOSE UR STRIP-MCNC: NEGATIVE MG/DL
HCT VFR BLD AUTO: 47.8 % (ref 40–53)
HGB BLD-MCNC: 15.9 G/DL (ref 13.3–17.7)
HGB UR QL STRIP: NEGATIVE
IMM GRANULOCYTES # BLD: 0 10E3/UL
IMM GRANULOCYTES NFR BLD: 0 %
KETONES UR STRIP-MCNC: NEGATIVE MG/DL
LEUKOCYTE ESTERASE UR QL STRIP: NEGATIVE
LYMPHOCYTES # BLD AUTO: 1.7 10E3/UL (ref 0.8–5.3)
LYMPHOCYTES NFR BLD AUTO: 30 %
MAGNESIUM SERPL-MCNC: 1.9 MG/DL (ref 1.7–2.3)
MCH RBC QN AUTO: 30.7 PG (ref 26.5–33)
MCHC RBC AUTO-ENTMCNC: 33.3 G/DL (ref 31.5–36.5)
MCV RBC AUTO: 92 FL (ref 78–100)
MONOCYTES # BLD AUTO: 0.5 10E3/UL (ref 0–1.3)
MONOCYTES NFR BLD AUTO: 8 %
NEUTROPHILS # BLD AUTO: 3.2 10E3/UL (ref 1.6–8.3)
NEUTROPHILS NFR BLD AUTO: 57 %
NITRATE UR QL: NEGATIVE
NRBC # BLD AUTO: 0 10E3/UL
NRBC BLD AUTO-RTO: 0 /100
PH UR STRIP: 7 [PH] (ref 5–7)
PLATELET # BLD AUTO: 187 10E3/UL (ref 150–450)
POTASSIUM SERPL-SCNC: 3.8 MMOL/L (ref 3.4–5.3)
RBC # BLD AUTO: 5.18 10E6/UL (ref 4.4–5.9)
RBC URINE: 0 /HPF
SODIUM SERPL-SCNC: 138 MMOL/L (ref 136–145)
SP GR UR STRIP: 1.02 (ref 1–1.03)
TSH SERPL DL<=0.005 MIU/L-ACNC: 1.45 UIU/ML (ref 0.3–4.2)
UROBILINOGEN UR STRIP-MCNC: NORMAL MG/DL
WBC # BLD AUTO: 5.7 10E3/UL (ref 4–11)
WBC URINE: <1 /HPF

## 2023-04-04 PROCEDURE — 84443 ASSAY THYROID STIM HORMONE: CPT | Performed by: PHYSICIAN ASSISTANT

## 2023-04-04 PROCEDURE — 36415 COLL VENOUS BLD VENIPUNCTURE: CPT | Performed by: PHYSICIAN ASSISTANT

## 2023-04-04 PROCEDURE — 85025 COMPLETE CBC W/AUTO DIFF WBC: CPT | Performed by: PHYSICIAN ASSISTANT

## 2023-04-04 PROCEDURE — 72148 MRI LUMBAR SPINE W/O DYE: CPT

## 2023-04-04 PROCEDURE — 81001 URINALYSIS AUTO W/SCOPE: CPT | Performed by: PHYSICIAN ASSISTANT

## 2023-04-04 PROCEDURE — 72146 MRI CHEST SPINE W/O DYE: CPT

## 2023-04-04 PROCEDURE — 99285 EMERGENCY DEPT VISIT HI MDM: CPT | Mod: 25

## 2023-04-04 PROCEDURE — 83735 ASSAY OF MAGNESIUM: CPT | Performed by: PHYSICIAN ASSISTANT

## 2023-04-04 PROCEDURE — 72141 MRI NECK SPINE W/O DYE: CPT

## 2023-04-04 PROCEDURE — 80048 BASIC METABOLIC PNL TOTAL CA: CPT | Performed by: PHYSICIAN ASSISTANT

## 2023-04-04 RX ORDER — METHYLPREDNISOLONE 4 MG
TABLET, DOSE PACK ORAL
Qty: 21 TABLET | Refills: 0 | Status: SHIPPED | OUTPATIENT
Start: 2023-04-04 | End: 2023-04-19

## 2023-04-04 RX ORDER — CYCLOBENZAPRINE HCL 10 MG
10 TABLET ORAL 3 TIMES DAILY PRN
Qty: 18 TABLET | Refills: 0 | Status: SHIPPED | OUTPATIENT
Start: 2023-04-04 | End: 2023-04-19

## 2023-04-04 RX ORDER — METHYLPREDNISOLONE 4 MG
TABLET, DOSE PACK ORAL
Qty: 21 TABLET | Refills: 0 | Status: SHIPPED | OUTPATIENT
Start: 2023-04-04 | End: 2023-04-04

## 2023-04-04 RX ORDER — CYCLOBENZAPRINE HCL 10 MG
10 TABLET ORAL 3 TIMES DAILY PRN
Qty: 18 TABLET | Refills: 0 | Status: SHIPPED | OUTPATIENT
Start: 2023-04-04 | End: 2023-04-04

## 2023-04-04 ASSESSMENT — ENCOUNTER SYMPTOMS
FEVER: 0
BACK PAIN: 1
NUMBNESS: 1

## 2023-04-04 ASSESSMENT — ACTIVITIES OF DAILY LIVING (ADL)
ADLS_ACUITY_SCORE: 33
ADLS_ACUITY_SCORE: 33

## 2023-04-04 NOTE — ED TRIAGE NOTES
A&O x4.  ABC's intact.      Pt arrives with c/o left lower back pain going down leg with tingling of foot & toes w/ bilateral arm tingling. Saw a chiropractor couple days ago and thinks has a herniated disc.

## 2023-04-04 NOTE — ED PROVIDER NOTES
History     Chief Complaint:  Back Pain    The history is provided by the patient.      Ortiz Johns is a 22 year old male with a history of anxieety who presents with back pain. The patient reports that throughout the past year, he has been having lower back pain and bilateral leg numbness. He comes to the ED today because his low back pain and leg numbness is especially bad, worse than its ever been. He also endorses groin/perineal/perianal numbness, as well as urinary incontinence chronically. The patient says he was evaluated for this in the past, but without any significant findings. He denies any fevers, history of back surgeries, or family history of neurologic disorders.  He does not use IV drugs.  No rashes or tick bites.  No immune compromise.  He is not on blood thinners.  No recent immunizations.  Patient and mother report he has seen numerous doctors for this including neurologist, urologist, and family medicine doctors though on review of records and in discussion with them it does not sound like he has ever undergone MRI of the spine.  They report he has had imaging of abdomen and testicles though denies pain in these areas at present.  No vision changes slurred speech dizziness or headache.    Independent Historian:   Mother - They report part of above history    Review of External Notes: I reviewed 3/1/2023 urology note Latasha Sams note pelvic floor dysfunction and scrotal pain at that time with frequency and nocturia    ROS:  Review of Systems   Constitutional: Negative for fever.   Genitourinary:        + urinary incontinence    Musculoskeletal: Positive for back pain.   Neurological: Positive for numbness.   All other systems reviewed and are negative.    Allergies:  No Known Allergies     Medications:   Guanfacine  Zoloft     Past Medical History:    ADHD  Anxiety     Social History:   reports that he has never smoked. He has never used smokeless tobacco. He reports that he does not drink  alcohol and does not use drugs.  PCP: [facility]   Presents with mother.     Physical Exam     Patient Vitals for the past 24 hrs:   BP Temp Temp src Pulse Resp SpO2   04/04/23 1345 126/69 -- -- 70 -- 98 %   04/04/23 1155 (!) 154/82 98.5  F (36.9  C) Temporal 75 18 100 %        Physical Exam  General: Awake, alert, non-toxic.  Head:  Scalp is NC/AT  Eyes:  Conjunctiva normal, PERRL  ENT:  The external nose and ears are normal.   Neck:  Normal range of motion without rigidity.  CV:  Regular rate and rhythm    No pathologic murmur, rubs, or gallops.  Resp:  Breath sounds are clear bilaterally    Non-labored, no retractions or accessory muscle use  Abdomen: Abdomen is soft, no distension, no tenderness, no masses. No CVA tenderness.  MS:  No lower extremity edema or asymmetric calf swelling. No midline cervical, thoracic, or lumbar tenderness  Skin:  Warm and dry, No rash or lesions noted.  Neuro: Alert and oriented.  GCS 15 5/5 strength BL in UE and LE, normal sensation to touch.  Cranial nerves 2-12 intact.  Normal finger to nose testing.  Gait normal.  2+ patellar and Achilles reflexes bilaterally.  Psych:  Awake. Alert. Normal affect. Appropriate interactions.      Emergency Department Course     Imaging:  MR Thoracic Spine w/o Contrast   Final Result   IMPRESSION:   MRI cervical spine:   1.  Minimal degenerative change. No spinal canal or foraminal stenosis   to indicate spinal cord or nerve root impingement by MRI.   2.  Normal cervical spinal cord.      MRI thoracic spine:   1.  Minimal degenerative loss of disc height at T7-T8.   2.  Otherwise unremarkable thoracic spine MRI.      MRI lumbar spine:   1.  Minor to mild degenerative changes lower lumbar spine at L4-L5 and   L5-S1 without high-grade narrowing or neural impingement by MRI.   2.  Otherwise unremarkable lumbar spine MRI.      KANE HELTON MD            SYSTEM ID:  [id number]      MR Lumbar Spine w/o Contrast   Final Result    IMPRESSION:   MRI cervical spine:   1.  Minimal degenerative change. No spinal canal or foraminal stenosis   to indicate spinal cord or nerve root impingement by MRI.   2.  Normal cervical spinal cord.      MRI thoracic spine:   1.  Minimal degenerative loss of disc height at T7-T8.   2.  Otherwise unremarkable thoracic spine MRI.      MRI lumbar spine:   1.  Minor to mild degenerative changes lower lumbar spine at L4-L5 and   L5-S1 without high-grade narrowing or neural impingement by MRI.   2.  Otherwise unremarkable lumbar spine MRI.      KANE HELTON MD            SYSTEM ID:  [id number]      MR Cervical Spine w/o Contrast   Final Result   IMPRESSION:   MRI cervical spine:   1.  Minimal degenerative change. No spinal canal or foraminal stenosis   to indicate spinal cord or nerve root impingement by MRI.   2.  Normal cervical spinal cord.      MRI thoracic spine:   1.  Minimal degenerative loss of disc height at T7-T8.   2.  Otherwise unremarkable thoracic spine MRI.      MRI lumbar spine:   1.  Minor to mild degenerative changes lower lumbar spine at L4-L5 and   L5-S1 without high-grade narrowing or neural impingement by MRI.   2.  Otherwise unremarkable lumbar spine MRI.      KANE HELTON MD            SYSTEM ID:  [id number]         Report per radiology    Laboratory:  Labs Ordered and Resulted from Time of ED Arrival to Time of ED Departure   BASIC METABOLIC PANEL - Normal       Result Value    Sodium 138      Potassium 3.8      Chloride 102      Carbon Dioxide (CO2) 26      Anion Gap 10      Urea Nitrogen 16.4      Creatinine 0.80      Calcium 9.6      Glucose 89      GFR Estimate >90     TSH WITH FREE T4 REFLEX - Normal    TSH 1.45     MAGNESIUM - Normal    Magnesium 1.9     ROUTINE UA WITH MICROSCOPIC REFLEX TO CULTURE - Normal    Color Urine Light Yellow      Appearance Urine Clear      Glucose Urine Negative      Bilirubin Urine Negative      Ketones Urine Negative      Specific Gravity Urine  1.017      Blood Urine Negative      pH Urine 7.0      Protein Albumin Urine Negative      Urobilinogen Urine Normal      Nitrite Urine Negative      Leukocyte Esterase Urine Negative      RBC Urine 0      WBC Urine <1     CBC WITH PLATELETS AND DIFFERENTIAL    WBC Count 5.7      RBC Count 5.18      Hemoglobin 15.9      Hematocrit 47.8      MCV 92      MCH 30.7      MCHC 33.3      RDW 12.1      Platelet Count 187      % Neutrophils 57      % Lymphocytes 30      % Monocytes 8      % Eosinophils 4      % Basophils 1      % Immature Granulocytes 0      NRBCs per 100 WBC 0      Absolute Neutrophils 3.2      Absolute Lymphocytes 1.7      Absolute Monocytes 0.5      Absolute Eosinophils 0.2      Absolute Basophils 0.0      Absolute Immature Granulocytes 0.0      Absolute NRBCs 0.0       Emergency Department Course & Assessments:       Interventions:  Medications - No data to display     Assessments:  1300 I obtained history and examined the patient as noted above.   1430 I rechecked the patient and explained findings. Prepared for discharge.     Independent Interpretation (X-rays, CTs, rhythm strip):  None    Consultations/Discussion of Management or Tests:  None   The patient arrived in triage where vitals were measured and recorded.   The patient was then escorted back to the emergency department.   The patient's medical records were reviewed.  Nursing notes and vitals were reviewed.    I performed an exam of the patient as documented above. The patient is in agreement with my plan of care.     Social Determinants of Health affecting care:   Education/Literacy  Limited understanding of medical system and tests/specialists.  Disposition:  The patient was discharged to home.     Impression & Plan      Medical Decision Makin-year-old male presents for evaluation of low back pain paresthesias in the legs and hands bilaterally.  Broad differential considered.  It sounds like this has been going on for about a year.  Is  also had some chronic urinary incontinence for which she is followed by urology and suspected pelvic floor dysfunction.  Despite these reported symptoms it sounds like the patient has never undergone any MRI imaging of the spine and given his low back and neck pain as well as symptoms in both the hands and feet did elect to obtain MRI of the full spine which is essentially unremarkable shows no cauda equina conus medullaris cord signal abnormalities fracture and only minor degenerative disease and disc disease.  Study was ordered with contrast but changed by radiologist as they feel this not needed per MRI tech as should be adequately sensitive given no prior surgeries, and admittedly concern for spinal infection is very low given no fevers IV drug use immune compromise or other risk factors.  Electrolytes TSH unremarkable.  UA appears clear.  He has normal reflexes and strength objectively on exam and I do not suspect Guillain-Barré syndrome or transverse myelitis.  Given bilateral nature of symptoms and no cranial nerve/cerebral symptoms not consistent with stroke.    The source of the patient's symptoms is unclear but they have been ongoing for about a year.  Initially reported he had been evaluated by multiple neurologists at 3 different clinics though on further discussion and after extensive chart review by myself it seems pt and mother confused and has never actually seen a neurologist.  Discussed differences in expertise between urologist and neurologist.  I think this is an appropriate next step.  Priority referral placed to neurology for outpatient follow-up likely requires further evaluation.  I do not think this is needed emergently given his objectively normal neurologic exam and the chronicity of his symptoms.  Will trial a Medrol Dosepak and Flexeril for the low back pain radiating to the legs per their request.  Return precautions discussed.    Diagnosis:    ICD-10-CM    1. Paresthesias  R20.2 Adult  Neurology  Referral      2. Chronic bilateral low back pain with bilateral sciatica  M54.42 Adult Neurology  Referral    M54.41     G89.29            Discharge Medications:  Discharge Medication List as of 4/4/2023  4:13 PM      START taking these medications    Details   cyclobenzaprine (FLEXERIL) 10 MG tablet Take 1 tablet (10 mg) by mouth 3 times daily as needed for other (Pain), Disp-18 tablet, R-0, E-Prescribe      methylPREDNISolone (MEDROL DOSEPAK) 4 MG tablet therapy pack Follow Package Directions, Disp-21 tablet, R-0, E-Prescribe            Scribe Disclosure:  I, Brian Herrera, am serving as a scribe at 12:50 PM on 4/4/2023 to document services personally performed by Freddie Henderson PA-C based on my observations and the provider's statements to me.   4/4/2023   Freddie Henderson PA-C Etten, Clark Ellsworth, PA-C  04/04/23 1817       Freddie Henderson PA-C  04/04/23 2147

## 2023-04-18 NOTE — PROGRESS NOTES
"In person evaluation      HPI  4/19/2023, in person consultation    22-year-old being evaluated neurologically for:  Weakness/stiffness of his legs  Erectile dysfunction  Paresthesias legs greater than hands  Hyperreflexia    Patient with difficulty going on since November 2021  Really complains more of the erectile dysfunction has trouble holding an erection cannot ejaculate no new meds started at the time disc came on    Said that he also developed numb feet more so than numb hands sometimes can go up into the legs a bit mostly in the feet though  Legs feel stiff  Left leg more so than the right      Does have some low back pain that can be radicular more so into the left than the right    Played sports  Hockey lacrosse and football  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  Patient is had lower back pain throughout the past year according to notes.  Had bilateral leg numbness.  Also had some groin/perineal/perianal numbness.  Also had urinary incontinence chronically  Patient underwent scanning of cervical thoracic and lumbar spine no evidence of any compression of the spinal cord    Patient does follow with urology last seen 3/1/2023 and has \"pelvic floor dysfunction/scrotal pain with frequency and nocturia    Patient had \"normal reflexes and strength when evaluated in the ER on 4/4/2023.  They did not feel that this was an acute DM Barré or transverse myelitis due to his exam.    A.  Numbness and tingling/paresthesias       Bilateral legs and feet       Left worse than right       Onset November 2021         Erectile dysfunction       Hyperreflexia with some clonus in the left ankle       Paresthesias that come and go left greater than right lower extremity greater " "than upper extremity       No history of visual loss        Question whether patient has more of a demyelinating disease such as MS      B.  Pelvic floor dysfunction       Follows with urology       Patient does follow with urology last seen 3/1/2023 and has       \"pelvic floor dysfunction/scrotal pain with frequency and nocturia        Past medical history  ADD  Generalized anxiety disorder      Habits  Does not smoke  Does chew tobacco  Does not drink alcohol  Drives a large truck  Played hockey/lacrosse/football  No toxic exposure      Family history  Mother thyroid disease  Father hypertension/hyperlipidemia  Brother healthy  Paternal grandmother breast cancer      Work-up  TSH 1.62, (10/5/2021)  CT abdomen pelvis see official report 1/4/2023  1.  Mild prominence/enlargement of the prostate gland.        Nothing for prostatic abscess.   2.  CT abdomen pelvis is otherwise negative.  MRI cervical spine: 4/4/2023  1.  Minimal degenerative change.        No spinal canal or foraminal stenosis to indicate spinal cord or nerve root impingement by MRI.  2.  Normal cervical spinal cord.  MRI thoracic spine: 4/4/2023   1.  Minimal degenerative loss of disc height at T7-T8.  2.  Otherwise unremarkable thoracic spine MRI.  MRI lumbar spine: 4/4/2023  1.  Minor to mild degenerative changes lower lumbar spine at L4-L5 and  L5-S1 without high-grade narrowing or neural impingement by MRI.  2.  Otherwise unremarkable lumbar spine MRI.  TSH 1.45, (4/4/2023)        Exam  Review of systems pertinent positives and negatives  Does have some neck and back pain more low back that radiates into the legs left greater than right    Has achy joints  No dry mouth no dry eyes    Has numbness and tingling more so in the feet and the hands  More so on the left than the right  Feels stiff in the legs bilaterally left greater than right  Balance off due to the stiffness    Bladder difficulty with urgency and trouble with erections    Does have " some anxiety and depression    Some bowel difficulties    No diplopia dysarthria dysphagia  No actual visual loss  No trouble talking  Otherwise review of systems negative    General exam  Blood pressure 134/81, pulse 79  HEENT normal  Lungs clear  Heart rate regular  Abdomen soft  Symmetrical pulses  No edema the feet  Negative straight leg raising sign    Neurologic exam  Alert oriented x3  Normal prosody speech  Normal naming  Normal comprehension  Normal repetition  No aphasia  No neglect  Memory recall good    Cranial nerves II through XII  No ophthalmoplegia  No nystagmus  Pupils equal round reactive to light  No Kendall Devin phenomenon  No internuclear ophthalmoplegia  Funduscopic exam normal no papilledema no optic atrophy  Face symmetrical  Tongue twisters good    Upper extremities  No drift  No tremor  Normal finger-nose  Rapid alternating movements slightly slower on the left than the right but is right-handed    Distal proximal strength reported right over left  Deltoid 5/5  Infraspinatus 5/5  Biceps 5/5  Triceps 5/5  Wrist finger extensors 5/5  Wrist finger flexors 5/5  Intrinsic hand strength 5/5    Lower extremities  Reported right over left  Iliopsoas 5/5  Hamstring 5/5  Quadriceps 5/5  Anterior tibial 5/5  Posterior tibial 5/5  EHL 5/5  Gastrocnemius 5/5    Increased tone lower extremities slight spastic catch    Reflexes reported right over left  Biceps 2/2  Triceps 2/2  Brachioradialis 2/2  Negative [place] reflex  Knee 2/2  Ankle 3/4 nonsustained clonus on the left  Toes downgoing    Sensory exam  Light touch/pinprick/vibration/temperature appreciation normal in the upper and lower extremities    Gait  Normal  Romberg negative  Tandem okay      Assessment/plan      1.  Patient with paresthesias going on since November 2021 with some erectile dysfunction       Hyperreflexia worse on the left than the right slow rapid altering movements on the left hand       Spastic catch in the left leg  nonsustained clonus left ankle       No cranial nerve changes       Fluctuating paresthesias         Question multiple sclerosis    2.  Paresthesias achy joints and pain             Question rheumatologic condition    3.  Pelvic floor condition following with urology difficulty with erections    Plan  MRI scan head with and without contrast rule out MS    B12  XOCHITL  Rheumatoid factor  ESR  CPK    Follow-up after the above      Extensive chart review  Total care time today 62 minutes    As part of the work-up today  Reviewed primary MD note 2/6/2023  Reviewed MRI scans  Reviewed ER notes 4/4/2023  Reviewed primary MD notes 12/27/2022    Addendum 4/19/2023  Laboratory data 4/19/2023  CPK 85  ESR 6  Rheumatoid factor negative  XOCHITL borderline positive, homogeneous pattern 1:40  B12 942  MRI head pending  Follow-up 5/31/2023 pending

## 2023-04-19 ENCOUNTER — OFFICE VISIT (OUTPATIENT)
Dept: NEUROLOGY | Facility: CLINIC | Age: 23
End: 2023-04-19
Payer: COMMERCIAL

## 2023-04-19 ENCOUNTER — LAB (OUTPATIENT)
Dept: LAB | Facility: HOSPITAL | Age: 23
End: 2023-04-19
Payer: COMMERCIAL

## 2023-04-19 VITALS
DIASTOLIC BLOOD PRESSURE: 81 MMHG | BODY MASS INDEX: 25.62 KG/M2 | HEART RATE: 79 BPM | HEIGHT: 71 IN | WEIGHT: 183 LBS | SYSTOLIC BLOOD PRESSURE: 134 MMHG

## 2023-04-19 DIAGNOSIS — R29.2 HYPERREFLEXIA: ICD-10-CM

## 2023-04-19 DIAGNOSIS — R29.2 ABNORMAL REFLEX: Primary | ICD-10-CM

## 2023-04-19 DIAGNOSIS — M62.81 GENERALIZED MUSCLE WEAKNESS: Primary | ICD-10-CM

## 2023-04-19 DIAGNOSIS — R20.2 PARESTHESIA: ICD-10-CM

## 2023-04-19 DIAGNOSIS — R20.2 PARESTHESIAS: ICD-10-CM

## 2023-04-19 DIAGNOSIS — M62.81 GENERALIZED MUSCLE WEAKNESS: ICD-10-CM

## 2023-04-19 LAB
CK SERPL-CCNC: 85 U/L (ref 39–308)
ERYTHROCYTE [SEDIMENTATION RATE] IN BLOOD BY WESTERGREN METHOD: 6 MM/HR (ref 0–15)
VIT B12 SERPL-MCNC: 942 PG/ML (ref 232–1245)

## 2023-04-19 PROCEDURE — 99205 OFFICE O/P NEW HI 60 MIN: CPT | Performed by: PSYCHIATRY & NEUROLOGY

## 2023-04-19 PROCEDURE — 36415 COLL VENOUS BLD VENIPUNCTURE: CPT

## 2023-04-19 PROCEDURE — 86038 ANTINUCLEAR ANTIBODIES: CPT

## 2023-04-19 PROCEDURE — 82550 ASSAY OF CK (CPK): CPT

## 2023-04-19 PROCEDURE — 85652 RBC SED RATE AUTOMATED: CPT

## 2023-04-19 PROCEDURE — 82607 VITAMIN B-12: CPT

## 2023-04-19 PROCEDURE — 86431 RHEUMATOID FACTOR QUANT: CPT

## 2023-04-19 NOTE — NURSING NOTE
Chief Complaint   Patient presents with     Numbness     Pt has been having numbness and tingling in bilat legs/feet. Left is worse. Pt has had this for about a year.  Flexeril was helping-ran out of medication     Low back pain     Pt has had for about a year.     Bobbi Smith LPN on 4/19/2023 at 10:55 AM

## 2023-04-19 NOTE — LETTER
"    4/19/2023         RE: Ortiz Johns  [address] [facility] [address]        Dear Colleague,    Thank you for referring your patient, Ortiz Johns, to the [facility] [place]. Please see a copy of my visit note below.    In person evaluation      HPI  4/19/2023, in person consultation    22-year-old being evaluated neurologically for:  Weakness/stiffness of his legs  Erectile dysfunction  Paresthesias legs greater than hands  Hyperreflexia    Patient with difficulty going on since November 2021  Really complains more of the erectile dysfunction has trouble holding an erection cannot ejaculate no new meds started at the time disc came on    Said that he also developed numb feet more so than numb hands sometimes can go up into the legs a bit mostly in the feet though  Legs feel stiff  Left leg more so than the right      Does have some low back pain that can be radicular more so into the left than the right    Played sports  Hockey lacrosse and football  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  Patient is had lower back pain throughout the past year according to notes.  Had bilateral leg numbness.  Also had some groin/perineal/perianal numbness.  Also had urinary incontinence chronically  Patient underwent scanning of cervical thoracic and lumbar spine no evidence of any compression of the spinal cord    Patient does follow with urology last seen 3/1/2023 and has \"pelvic floor dysfunction/scrotal pain with frequency and nocturia    Patient had \"normal reflexes and strength when evaluated in the ER on 4/4/2023.  They did not feel that this was an acute DM Barré or transverse myelitis due to his exam.    A.  Numbness and " "tingling/paresthesias       Bilateral legs and feet       Left worse than right       Onset November 2021         Erectile dysfunction       Hyperreflexia with some clonus in the left ankle       Paresthesias that come and go left greater than right lower extremity greater than upper extremity       No history of visual loss        Question whether patient has more of a demyelinating disease such as MS      B.  Pelvic floor dysfunction       Follows with urology       Patient does follow with urology last seen 3/1/2023 and has       \"pelvic floor dysfunction/scrotal pain with frequency and nocturia        Past medical history  ADD  Generalized anxiety disorder      Habits  Does not smoke  Does chew tobacco  Does not drink alcohol  Drives a large truck  Played hockey/lacrosse/football  No toxic exposure      Family history  Mother thyroid disease  Father hypertension/hyperlipidemia  Brother healthy    Work-up  TSH 1.62, (10/5/2021)  CT abdomen pelvis see official report 1/4/2023  1.  Mild prominence/enlargement of the prostate gland.        Nothing for prostatic abscess.   2.  CT abdomen pelvis is otherwise negative.  MRI cervical spine: 4/4/2023  1.  Minimal degenerative change.        No spinal canal or foraminal stenosis to indicate spinal cord or nerve root impingement by MRI.  2.  Normal cervical spinal cord.  MRI thoracic spine: 4/4/2023   1.  Minimal degenerative loss of disc height at T7-T8.  2.  Otherwise unremarkable thoracic spine MRI.  MRI lumbar spine: 4/4/2023  1.  Minor to mild degenerative changes lower lumbar spine at L4-L5 and  L5-S1 without high-grade narrowing or neural impingement by MRI.  2.  Otherwise unremarkable lumbar spine MRI.  TSH 1.45, (4/4/2023)        Exam  Review of systems pertinent positives and negatives  Does have some neck and back pain more low back that radiates into the legs left greater than right    Has achy joints  No dry mouth no dry eyes    Has numbness and tingling more " so in the feet and the hands  More so on the left than the right  Feels stiff in the legs bilaterally left greater than right  Balance off due to the stiffness    Bladder difficulty with urgency and trouble with erections    Does have some anxiety and depression    Some bowel difficulties    No diplopia dysarthria dysphagia  No actual visual loss  No trouble talking  Otherwise review of systems negative    General exam  Blood pressure 134/81, pulse 79  HEENT normal  Lungs clear  Heart rate regular  Abdomen soft  Symmetrical pulses  No edema the feet  Negative straight leg raising sign    Neurologic exam  Alert oriented x3  Normal prosody speech  Normal naming  Normal comprehension  Normal repetition  No aphasia  No neglect  Memory recall good    Cranial nerves II through XII  No ophthalmoplegia  No nystagmus  Pupils equal round reactive to light  No Kendall Devin phenomenon  No internuclear ophthalmoplegia  Funduscopic exam normal no papilledema no optic atrophy  Face symmetrical  Tongue twisters good    Upper extremities  No drift  No tremor  Normal finger-nose  Rapid alternating movements slightly slower on the left than the right but is right-handed    Distal proximal strength reported right over left  Deltoid 5/5  Infraspinatus 5/5  Biceps 5/5  Triceps 5/5  Wrist finger extensors 5/5  Wrist finger flexors 5/5  Intrinsic hand strength 5/5    Lower extremities  Reported right over left  Iliopsoas 5/5  Hamstring 5/5  Quadriceps 5/5  Anterior tibial 5/5  Posterior tibial 5/5  EHL 5/5  Gastrocnemius 5/5    Increased tone lower extremities slight spastic catch    Reflexes reported right over left  Biceps 2/2  Triceps 2/2  Brachioradialis 2/2  Negative [place] reflex  Knee 2/2  Ankle 3/4 nonsustained clonus on the left  Toes downgoing    Sensory exam  Light touch/pinprick/vibration/temperature appreciation normal in the upper and lower extremities    Gait  Normal  Romberg negative  Tandem  okay      Assessment/plan      1.  Patient with paresthesias going on since November 2021 with some erectile dysfunction       Hyperreflexia worse on the left than the right slow rapid altering movements on the left hand       Spastic catch in the left leg nonsustained clonus left ankle       No cranial nerve changes       Fluctuating paresthesias         Question multiple sclerosis    2.  Paresthesias achy joints and pain             Question rheumatologic condition    3.  Pelvic floor condition following with urology difficulty with erections    Plan  MRI scan head with and without contrast rule out MS    B12  XOCHITL  Rheumatoid factor  ESR  CPK    Follow-up after the above      Extensive chart review  Total care time today 62 minutes    As part of the work-up today  Reviewed primary MD note 2/6/2023  Reviewed MRI scans  Reviewed ER notes 4/4/2023  Reviewed primary MD notes 12/27/2022      Again, thank you for allowing me to participate in the care of your patient.        Sincerely,        Aron Joe MD

## 2023-04-20 LAB
ANA PAT SER IF-IMP: ABNORMAL
ANA SER QL IF: ABNORMAL
ANA TITR SER IF: ABNORMAL {TITER}
RHEUMATOID FACT SER NEPH-ACNC: <7 IU/ML

## 2023-06-06 ENCOUNTER — HOSPITAL ENCOUNTER (EMERGENCY)
Facility: CLINIC | Age: 23
Discharge: HOME | End: 2023-06-06
Attending: STUDENT IN AN ORGANIZED HEALTH CARE EDUCATION/TRAINING PROGRAM | Admitting: STUDENT IN AN ORGANIZED HEALTH CARE EDUCATION/TRAINING PROGRAM
Payer: OTHER MISCELLANEOUS

## 2023-06-06 ENCOUNTER — APPOINTMENT (OUTPATIENT)
Dept: CT IMAGING | Facility: CLINIC | Age: 23
End: 2023-06-06
Attending: STUDENT IN AN ORGANIZED HEALTH CARE EDUCATION/TRAINING PROGRAM
Payer: OTHER MISCELLANEOUS

## 2023-06-06 VITALS
HEIGHT: 71 IN | TEMPERATURE: 97.9 F | OXYGEN SATURATION: 99 % | RESPIRATION RATE: 20 BRPM | BODY MASS INDEX: 25.62 KG/M2 | SYSTOLIC BLOOD PRESSURE: 133 MMHG | HEART RATE: 90 BPM | DIASTOLIC BLOOD PRESSURE: 63 MMHG | WEIGHT: 183 LBS
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
## 2023-06-06 DIAGNOSIS — M54.42 ACUTE LEFT-SIDED LOW BACK PAIN WITH LEFT-SIDED SCIATICA: ICD-10-CM

## 2023-06-06 PROCEDURE — 96372 THER/PROPH/DIAG INJ SC/IM: CPT | Performed by: STUDENT IN AN ORGANIZED HEALTH CARE EDUCATION/TRAINING PROGRAM

## 2023-06-06 PROCEDURE — 99285 EMERGENCY DEPT VISIT HI MDM: CPT | Mod: 25

## 2023-06-06 PROCEDURE — 72128 CT CHEST SPINE W/O DYE: CPT

## 2023-06-06 PROCEDURE — 250N000013 HC RX MED GY IP 250 OP 250 PS 637: Performed by: STUDENT IN AN ORGANIZED HEALTH CARE EDUCATION/TRAINING PROGRAM

## 2023-06-06 PROCEDURE — 72131 CT LUMBAR SPINE W/O DYE: CPT

## 2023-06-06 PROCEDURE — 250N000011 HC RX IP 250 OP 636: Performed by: STUDENT IN AN ORGANIZED HEALTH CARE EDUCATION/TRAINING PROGRAM

## 2023-06-06 RX ORDER — METHOCARBAMOL 500 MG/1
1000 TABLET, FILM COATED ORAL 3 TIMES DAILY PRN
Qty: 20 TABLET | Refills: 0 | Status: SHIPPED | OUTPATIENT
Start: 2023-06-06

## 2023-06-06 RX ORDER — METHOCARBAMOL 500 MG/1
1000 TABLET, FILM COATED ORAL ONCE
Status: COMPLETED | OUTPATIENT
Start: 2023-06-06 | End: 2023-06-06

## 2023-06-06 RX ORDER — KETOROLAC TROMETHAMINE 30 MG/ML
30 INJECTION, SOLUTION INTRAMUSCULAR; INTRAVENOUS ONCE
Status: COMPLETED | OUTPATIENT
Start: 2023-06-06 | End: 2023-06-06

## 2023-06-06 RX ADMIN — KETOROLAC TROMETHAMINE 30 MG: 30 INJECTION, SOLUTION INTRAMUSCULAR; INTRAVENOUS at 12:49

## 2023-06-06 RX ADMIN — METHOCARBAMOL TABLETS 1000 MG: 500 TABLET, COATED ORAL at 12:48

## 2023-06-06 ASSESSMENT — ACTIVITIES OF DAILY LIVING (ADL): ADLS_ACUITY_SCORE: 35
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
## 2023-11-14 ENCOUNTER — APPOINTMENT (OUTPATIENT)
Dept: URBAN - METROPOLITAN AREA CLINIC 253 | Age: 23
Setting detail: DERMATOLOGY
End: 2023-11-14

## 2023-11-14 VITALS — HEIGHT: 71 IN | WEIGHT: 195 LBS | RESPIRATION RATE: 14 BRPM

## 2023-11-14 DIAGNOSIS — D49.2 NEOPLASM OF UNSPECIFIED BEHAVIOR OF BONE, SOFT TISSUE, AND SKIN: ICD-10-CM

## 2023-11-14 DIAGNOSIS — L82.1 OTHER SEBORRHEIC KERATOSIS: ICD-10-CM

## 2023-11-14 DIAGNOSIS — D22 MELANOCYTIC NEVI: ICD-10-CM

## 2023-11-14 DIAGNOSIS — B35.4 TINEA CORPORIS: ICD-10-CM

## 2023-11-14 PROBLEM — L08.9 LOCAL INFECTION OF THE SKIN AND SUBCUTANEOUS TISSUE, UNSPECIFIED: Status: ACTIVE | Noted: 2023-11-14

## 2023-11-14 PROBLEM — D22.5 MELANOCYTIC NEVI OF TRUNK: Status: ACTIVE | Noted: 2023-11-14

## 2023-11-14 PROCEDURE — OTHER MIPS QUALITY: OTHER

## 2023-11-14 PROCEDURE — 11102 TANGNTL BX SKIN SINGLE LES: CPT

## 2023-11-14 PROCEDURE — OTHER COUNSELING: OTHER

## 2023-11-14 PROCEDURE — OTHER PHOTO-DOCUMENTATION: OTHER

## 2023-11-14 PROCEDURE — 99213 OFFICE O/P EST LOW 20 MIN: CPT | Mod: 25

## 2023-11-14 PROCEDURE — OTHER PRESCRIPTION: OTHER

## 2023-11-14 PROCEDURE — OTHER BIOPSY BY SHAVE METHOD: OTHER

## 2023-11-14 PROCEDURE — OTHER ADDITIONAL NOTES: OTHER

## 2023-11-14 RX ORDER — CLOTRIMAZOLE AND BETAMETHASONE DIPROPIONATE 10; .5 MG/G; MG/G
1-0.05% CREAM TOPICAL BID
Qty: 45 | Refills: 1 | Status: ERX | COMMUNITY
Start: 2023-11-14

## 2023-11-14 ASSESSMENT — LOCATION DETAILED DESCRIPTION DERM
LOCATION DETAILED: LEFT SUPERIOR UPPER BACK
LOCATION DETAILED: LEFT ANTERIOR PROXIMAL THIGH
LOCATION DETAILED: RIGHT SUPERIOR MEDIAL UPPER BACK

## 2023-11-14 ASSESSMENT — LOCATION SIMPLE DESCRIPTION DERM
LOCATION SIMPLE: LEFT THIGH
LOCATION SIMPLE: LEFT UPPER BACK
LOCATION SIMPLE: RIGHT UPPER BACK

## 2023-11-14 ASSESSMENT — LOCATION ZONE DERM
LOCATION ZONE: TRUNK
LOCATION ZONE: LEG

## 2023-11-14 NOTE — PROCEDURE: ADDITIONAL NOTES
Additional Notes: Recommended fbe \\nPt to follow up in two weeks if rash on leg not resolved, or if it changes or worsens at all, can do fbe at this time if pt desires \\n\\nCare instructions were explained to pt in detail. Biopsy care instructions given. Pt was explained plan in detail and agreed to plan and all instructions. Assistant was present for exam entirety.
Detail Level: Simple
Render Risk Assessment In Note?: no

## 2023-11-14 NOTE — PROCEDURE: BIOPSY BY SHAVE METHOD
Detail Level: Detailed
Depth Of Biopsy: dermis
Was A Bandage Applied: Yes
Size Of Lesion In Cm: 0
Biopsy Type: H and E
Biopsy Method: Dermablade
Anesthesia Type: 2% lidocaine with epinephrine and a 1:10 solution of 8.4% sodium bicarbonate
Anesthesia Volume In Cc (Will Not Render If 0): 0.1
Hemostasis: Drysol
Wound Care: Petrolatum
Dressing: bandage
Destruction After The Procedure: No
Type Of Destruction Used: Curettage
Curettage Text: The wound bed was treated with curettage after the biopsy was performed.
Cryotherapy Text: The wound bed was treated with cryotherapy after the biopsy was performed.
Electrodesiccation Text: The wound bed was treated with electrodesiccation after the biopsy was performed.
Electrodesiccation And Curettage Text: The wound bed was treated with electrodesiccation and curettage after the biopsy was performed.
Silver Nitrate Text: The wound bed was treated with silver nitrate after the biopsy was performed.
Lab: -[id number]
Consent: Written consent was obtained and risks were reviewed including but not limited to scarring, infection, bleeding, scabbing, incomplete removal, nerve damage and allergy to anesthesia.
Post-Care Instructions: I reviewed with the patient in detail post-care instructions. Patient is to keep the biopsy site dry overnight, and then apply bacitracin twice daily until healed. Patient may apply hydrogen peroxide soaks to remove any crusting.
Notification Instructions: Patient will be notified of biopsy results. However, patient instructed to call the office if not contacted within 2 weeks.
Billing Type: Third-Party Bill
Information: Selecting Yes will display possible errors in your note based on the variables you have selected. This validation is only offered as a suggestion for you. PLEASE NOTE THAT THE VALIDATION TEXT WILL BE REMOVED WHEN YOU FINALIZE YOUR NOTE. IF YOU WANT TO FAX A PRELIMINARY NOTE YOU WILL NEED TO TOGGLE THIS TO 'NO' IF YOU DO NOT WANT IT IN YOUR FAXED NOTE.

## 2023-11-14 NOTE — HPI: SKIN LESIONS
Is This A New Presentation, Or A Follow-Up?: Skin Lesions
Additional History: He would like moles on his back checked today. \\Aftab has a lot he states, he stated history of bad sunburn as a child. \\Aftab also has a spot on his leg he is concerned with. It just started two weeks ago. He wonders if it’s a melanoma or a spider bite. He didn’t see it grow, it maybe itches some.

## 2024-01-16 ENCOUNTER — APPOINTMENT (OUTPATIENT)
Dept: URBAN - METROPOLITAN AREA CLINIC 253 | Age: 24
Setting detail: DERMATOLOGY
End: 2024-01-16

## 2024-01-16 VITALS — HEIGHT: 71 IN | WEIGHT: 195 LBS | RESPIRATION RATE: 14 BRPM

## 2024-01-16 DIAGNOSIS — D22 MELANOCYTIC NEVI: ICD-10-CM

## 2024-01-16 PROBLEM — D22.5 MELANOCYTIC NEVI OF TRUNK: Status: ACTIVE | Noted: 2024-01-16

## 2024-01-16 PROCEDURE — OTHER MIPS QUALITY: OTHER

## 2024-01-16 PROCEDURE — 11300 SHAVE SKIN LESION 0.5 CM/<: CPT

## 2024-01-16 PROCEDURE — OTHER ADDITIONAL NOTES: OTHER

## 2024-01-16 PROCEDURE — OTHER COUNSELING: OTHER

## 2024-01-16 PROCEDURE — OTHER SHAVE REMOVAL: OTHER

## 2024-01-16 ASSESSMENT — LOCATION DETAILED DESCRIPTION DERM: LOCATION DETAILED: LEFT SUPERIOR UPPER BACK

## 2024-01-16 ASSESSMENT — LOCATION ZONE DERM: LOCATION ZONE: TRUNK

## 2024-01-16 ASSESSMENT — LOCATION SIMPLE DESCRIPTION DERM: LOCATION SIMPLE: LEFT UPPER BACK

## 2024-01-16 NOTE — PROCEDURE: SHAVE REMOVAL
Medical Necessity Information: It is in your best interest to select a reason for this procedure from the list below. All of these items fulfill various CMS LCD requirements except the new and changing color options.
Medical Necessity Clause: This procedure was medically necessary because the lesion that was treated was:
Lab: -[id number]
Lab Facility: 0
Detail Level: Detailed
Was A Bandage Applied: Yes
Size Of Lesion In Cm (Required): 0.5
Size Of Margin In Cm (Margins Are Not Added To Billing Dimensions): 0.2
Depth Of Shave: dermis
Biopsy Method: Dermablade
Anesthesia Type: 1% lidocaine with epinephrine
Hemostasis: Drysol
Wound Care: Petrolatum
Accession #: [id number]
Render Path Notes In Note?: No
Consent was obtained from the patient. The risks and benefits to therapy were discussed in detail. Specifically, the risks of infection, scarring, bleeding, prolonged wound healing, incomplete removal, allergy to anesthesia, nerve injury and recurrence were addressed. Prior to the procedure, the treatment site was clearly identified and confirmed by the patient. All components of Universal Protocol/PAUSE Rule completed.
Post-Care Instructions: I reviewed with the patient in detail post-care instructions. Patient is to keep the biopsy site dry overnight, and then apply bacitracin twice daily until healed. Patient may apply hydrogen peroxide soaks to remove any crusting.
Notification Instructions: Patient will be notified of pathology results. However, patient instructed to call the office if not contacted within 2 weeks.
Billing Type: Third-Party Bill

## 2024-01-16 NOTE — PROCEDURE: ADDITIONAL NOTES
Detail Level: Simple
Render Risk Assessment In Note?: no
Additional Notes: A clinical assistant was present for the exam. Care instructions of treated site were explained to the patient in detail. Told patient to call with any concerns or questions. The patient verbalized understanding and agreement of the education provided and the treatment plan. Encouraged patient to schedule a follow up appointment right after visit. At the end of the visit, all questions had been answered and the patient was satisfied with the visit

## 2024-01-19 ENCOUNTER — TELEPHONE (OUTPATIENT)
Dept: UROLOGY | Facility: CLINIC | Age: 24
End: 2024-01-19
Payer: COMMERCIAL

## 2024-01-19 DIAGNOSIS — N41.0 ACUTE PROSTATITIS: Primary | ICD-10-CM

## 2024-01-19 DIAGNOSIS — M62.89 PELVIC FLOOR DYSFUNCTION: ICD-10-CM

## 2024-01-19 NOTE — TELEPHONE ENCOUNTER
[facility] Call Center    Phone Message    May a detailed message be left on voicemail: yes     Reason for Call: Symptoms or Concerns     If patient has red-flag symptoms, warm transfer to triage line    Current symptom or concern: Severe Prostate Pain, Hard to catch breath sometimes, Back pain, Difficulty urinating. Pt mother Zoë does not want to take his son to ER because it has not been helpful for her son and wants to see a urologist. She would like to be seen asap with a male doctor. Please call pt. Thank you.      Symptoms have been present for:  1 week(s)    Has patient previously been seen for this? No    Are there any new or worsening symptoms? No    Action Taken: Message routed to:  Other: Uro    Travel Screening: Not Applicable

## 2024-01-22 ENCOUNTER — ALLIED HEALTH/NURSE VISIT (OUTPATIENT)
Dept: UROLOGY | Facility: CLINIC | Age: 24
End: 2024-01-22
Payer: COMMERCIAL

## 2024-01-22 DIAGNOSIS — M62.89 PELVIC FLOOR DYSFUNCTION: ICD-10-CM

## 2024-01-22 DIAGNOSIS — R39.198 DIFFICULTY URINATING: Primary | ICD-10-CM

## 2024-01-22 DIAGNOSIS — N41.0 ACUTE PROSTATITIS: ICD-10-CM

## 2024-01-22 LAB
ALBUMIN UR-MCNC: NEGATIVE MG/DL
APPEARANCE UR: CLEAR
BILIRUB UR QL STRIP: NEGATIVE
COLOR UR AUTO: YELLOW
GLUCOSE UR STRIP-MCNC: NEGATIVE MG/DL
HGB UR QL STRIP: NEGATIVE
KETONES UR STRIP-MCNC: NEGATIVE MG/DL
LEUKOCYTE ESTERASE UR QL STRIP: NEGATIVE
NITRATE UR QL: NEGATIVE
PH UR STRIP: 6.5 [PH] (ref 5–7)
RESIDUAL VOLUME (RV) (EXTERNAL): 10
SP GR UR STRIP: 1.02 (ref 1–1.03)
UROBILINOGEN UR STRIP-ACNC: 0.2 E.U./DL

## 2024-01-22 PROCEDURE — 87086 URINE CULTURE/COLONY COUNT: CPT

## 2024-01-22 PROCEDURE — 81003 URINALYSIS AUTO W/O SCOPE: CPT | Mod: QW

## 2024-01-22 PROCEDURE — 51798 US URINE CAPACITY MEASURE: CPT

## 2024-01-22 NOTE — PROGRESS NOTES
Ortiz Johns comes into clinic today for prostate pain and difficulty urinating, at the request of Dr. Ramírez, Ordering Provider for UAUC/PVR.    PVR: 10 mL by bladder scan.    Pt provided with semen collection kit.  Visit with Dr. Ramírez is scheduled for this Friday 1/26/24    This service provided today was under the supervising provider of the day Dr. Murphy, who was available if needed.    Christy Adorno, EMT

## 2024-01-23 LAB — BACTERIA UR CULT: NO GROWTH

## 2024-01-23 PROCEDURE — 87070 CULTURE OTHR SPECIMN AEROBIC: CPT

## 2024-01-23 PROCEDURE — 87205 SMEAR GRAM STAIN: CPT

## 2024-01-25 LAB
BACTERIA SMN CULT: NORMAL
GRAM STAIN RESULT: NORMAL
GRAM STAIN RESULT: NORMAL

## 2024-01-26 ENCOUNTER — OFFICE VISIT (OUTPATIENT)
Dept: UROLOGY | Facility: CLINIC | Age: 24
End: 2024-01-26
Payer: COMMERCIAL

## 2024-01-26 VITALS
SYSTOLIC BLOOD PRESSURE: 139 MMHG | HEART RATE: 42 BPM | BODY MASS INDEX: 27.72 KG/M2 | HEIGHT: 71 IN | DIASTOLIC BLOOD PRESSURE: 78 MMHG | WEIGHT: 198 LBS

## 2024-01-26 DIAGNOSIS — N52.9 ERECTILE DYSFUNCTION, UNSPECIFIED ERECTILE DYSFUNCTION TYPE: ICD-10-CM

## 2024-01-26 DIAGNOSIS — M62.89 PELVIC FLOOR DYSFUNCTION: ICD-10-CM

## 2024-01-26 DIAGNOSIS — R35.0 URINARY FREQUENCY: Primary | ICD-10-CM

## 2024-01-26 PROCEDURE — 99214 OFFICE O/P EST MOD 30 MIN: CPT | Performed by: STUDENT IN AN ORGANIZED HEALTH CARE EDUCATION/TRAINING PROGRAM

## 2024-01-26 ASSESSMENT — PAIN SCALES - GENERAL: PAINLEVEL: NO PAIN (0)

## 2024-01-26 NOTE — PROGRESS NOTES
"CHIEF COMPLAINT   Ortiz Johns who is a 23 year old male returns today for follow-up of LUTS including urinary frequency, erectile dysfunction, low back pain, pelvic pain    HPI   Ortiz Johns is a 23 year old male returns today for follow-up of LUTS including urinary frequency, erectile dysfunction, low back pain, pelvic pain    Saw Latasha Sams PA-C 3/1/2023 for virtual visit, see her note for pertinent details. He has also seen Dr. Peña @ [facility] previously and was diagnosed with pelvic floor dysfunction, had PFPT but doesn't seem to have helped.    He saw [facility] more recently and was started on daily tadalafil which improved his erectile dysfunction and some of his pelvic pain and pressure. He has never tried an alpha blocker. He said he took some of his mom's overactive bladder medications and that seemed to help his symptoms    Patient is convinced that his prostate is enlarged and causing his issues. In the past he has had a 2 week course of doxycycline for presumed prostatitis    PHYSICAL EXAM  Patient is a 23 year old  male   Vitals: Blood pressure 139/78, pulse (!) 42, height 1.803 m (5' 11\"), weight 89.8 kg (198 lb).  Body mass index is 27.62 kg/m .  General Appearance Adult:   Alert, no acute distress, oriented  HENT: throat/mouth:normal, good dentition  Lungs: no respiratory distress, or pursed lip breathing  Heart: No obvious jugular venous distension present  Abdomen: nondistended  Musculoskeltal: extremities normal, no peripheral edema  Skin: no suspicious lesions or rashes  Neuro: Alert, oriented, speech and mentation normal  Psych: affect and mood normal  Gait: Normal  : circ phallus, orthotopic and patent meatus  Normal testes bilat (left testicle lie is higher than the right)  Normal scrotal skin  30 gram benign feeling prostate      Component      Latest Ref Rng 1/22/2024  3:42 PM   Color Urine      Colorless, Straw, Light Yellow, Yellow  Yellow    Appearance Urine      Clear  " Clear    Glucose Urine      Negative mg/dL Negative    Bilirubin Urine      Negative  Negative    Ketones Urine      Negative mg/dL Negative    Specific Gravity Urine      1.003 - 1.035  1.020    Blood Urine      Negative  Negative    pH Urine      5.0 - 7.0  6.5    Protein Albumin Urine      Negative mg/dL Negative    Urobilinogen Urine      0.2, 1.0 E.U./dL 0.2    Nitrite Urine      Negative  Negative    Leukocyte Esterase Urine      Negative  Negative      1/23/2024  Culture 1+ Normal vasu               Gram Stain Result No organisms seen      1+ WBC seen             ASSESSMENT and PLAN  23 year old male returns today for follow-up of LUTS including urinary frequency, erectile dysfunction, low back pain, pelvic pain    Patient has a myriad of complaints which have had extensive evaluation by multiple different urologists in the past.    Re: ED: daily tadalafil is working well as expected, he should continue this    Urinary frequency: apparently his mother's OAB medication seemed to help (she was on vibegron). We will trial this for the patient (rx drug management)    Pelvic pain and low back pain: doubtful this is prostatitis. Most recent UA normal. Prostate is normal feeling on LEANNA. Should continue PFPT    - start vibegron 75 mg daily  - continue tadalafil 5 mg daily    Seymour Ramírez MD   [facility]  [facility] [place] [facility] Phone: [phone]

## 2024-01-26 NOTE — LETTER
"1/26/2024       RE: Ortiz Johns  [address] [address]     Dear Colleague,    Thank you for referring your patient, Ortiz Johns, to the [facility] [place] at [facility]. Please see a copy of my visit note below.    CHIEF COMPLAINT   Ortiz Johns who is a 23 year old male returns today for follow-up of LUTS including urinary frequency, erectile dysfunction, low back pain, pelvic pain    HPI   Ortiz Johns is a 23 year old male returns today for follow-up of LUTS including urinary frequency, erectile dysfunction, low back pain, pelvic pain    Saw Latasha Sams PA-C 3/1/2023 for virtual visit, see her note for pertinent details. He has also seen Dr. Peña @ [facility] previously and was diagnosed with pelvic floor dysfunction, had PFPT but doesn't seem to have helped.    He saw [facility] more recently and was started on daily tadalafil which improved his erectile dysfunction and some of his pelvic pain and pressure. He has never tried an alpha blocker. He said he took some of his mom's overactive bladder medications and that seemed to help his symptoms    Patient is convinced that his prostate is enlarged and causing his issues. In the past he has had a 2 week course of doxycycline for presumed prostatitis    PHYSICAL EXAM  Patient is a 23 year old  male   Vitals: Blood pressure 139/78, pulse (!) 42, height 1.803 m (5' 11\"), weight 89.8 kg (198 lb).  Body mass index is 27.62 kg/m .  General Appearance Adult:   Alert, no acute distress, oriented  HENT: throat/mouth:normal, good dentition  Lungs: no respiratory distress, or pursed lip breathing  Heart: No obvious jugular venous distension present  Abdomen: nondistended  Musculoskeltal: extremities normal, no peripheral edema  Skin: no suspicious lesions or rashes  Neuro: Alert, oriented, speech and mentation normal  Psych: affect and mood normal  Gait: Normal  : circ " phallus, orthotopic and patent meatus  Normal testes bilat (left testicle lie is higher than the right)  Normal scrotal skin  30 gram benign feeling prostate      Component      Latest Ref Rng 1/22/2024  3:42 PM   Color Urine      Colorless, Straw, Light Yellow, Yellow  Yellow    Appearance Urine      Clear  Clear    Glucose Urine      Negative mg/dL Negative    Bilirubin Urine      Negative  Negative    Ketones Urine      Negative mg/dL Negative    Specific Gravity Urine      1.003 - 1.035  1.020    Blood Urine      Negative  Negative    pH Urine      5.0 - 7.0  6.5    Protein Albumin Urine      Negative mg/dL Negative    Urobilinogen Urine      0.2, 1.0 E.U./dL 0.2    Nitrite Urine      Negative  Negative    Leukocyte Esterase Urine      Negative  Negative      1/23/2024  Culture 1+ Normal vasu               Gram Stain Result No organisms seen      1+ WBC seen             ASSESSMENT and PLAN  23 year old male returns today for follow-up of LUTS including urinary frequency, erectile dysfunction, low back pain, pelvic pain    Patient has a myriad of complaints which have had extensive evaluation by multiple different urologists in the past.    Re: ED: daily tadalafil is working well as expected, he should continue this    Urinary frequency: apparently his mother's OAB medication seemed to help (she was on vibegron). We will trial this for the patient (rx drug management)    Pelvic pain and low back pain: doubtful this is prostatitis. Most recent UA normal. Prostate is normal feeling on LEANNA. Should continue PFPT    - start vibegron 75 mg daily  - continue tadalafil 5 mg daily    Seymour Ramírez MD   [facility]  [facility] Phone: [phone]

## 2024-01-26 NOTE — NURSING NOTE
Chief Complaint   Patient presents with    Prostate pain    Urinary Frequency     Pt denies gross hematuria.    Christy Adorno, EMT

## 2024-04-02 ENCOUNTER — TRANSFERRED RECORDS (OUTPATIENT)
Dept: HEALTH INFORMATION MANAGEMENT | Facility: CLINIC | Age: 24
End: 2024-04-02

## 2024-06-26 ENCOUNTER — TRANSFERRED RECORDS (OUTPATIENT)
Dept: HEALTH INFORMATION MANAGEMENT | Facility: CLINIC | Age: 24
End: 2024-06-26

## 2024-07-15 ENCOUNTER — TRANSFERRED RECORDS (OUTPATIENT)
Dept: HEALTH INFORMATION MANAGEMENT | Facility: CLINIC | Age: 24
End: 2024-07-15

## 2024-11-14 ENCOUNTER — TRANSFERRED RECORDS (OUTPATIENT)
Dept: HEALTH INFORMATION MANAGEMENT | Facility: CLINIC | Age: 24
End: 2024-11-14

## 2024-11-15 ENCOUNTER — TRANSCRIBE ORDERS (OUTPATIENT)
Dept: OTHER | Age: 24
End: 2024-11-15

## 2024-11-15 DIAGNOSIS — N52.9 ED (ERECTILE DYSFUNCTION): Primary | ICD-10-CM

## 2024-12-05 ENCOUNTER — TRANSFERRED RECORDS (OUTPATIENT)
Dept: HEALTH INFORMATION MANAGEMENT | Facility: CLINIC | Age: 24
End: 2024-12-05
Payer: COMMERCIAL

## 2024-12-09 ENCOUNTER — MEDICAL CORRESPONDENCE (OUTPATIENT)
Dept: HEALTH INFORMATION MANAGEMENT | Facility: CLINIC | Age: 24
End: 2024-12-09
Payer: COMMERCIAL

## 2024-12-11 ENCOUNTER — TRANSCRIBE ORDERS (OUTPATIENT)
Dept: OTHER | Age: 24
End: 2024-12-11

## 2024-12-11 DIAGNOSIS — M53.3 PAIN IN THE COCCYX: ICD-10-CM

## 2024-12-11 DIAGNOSIS — M53.3 PAIN IN SACRUM: ICD-10-CM

## 2024-12-11 DIAGNOSIS — G89.29 CHRONIC LOW BACK PAIN: Primary | ICD-10-CM

## 2024-12-11 DIAGNOSIS — M54.50 CHRONIC LOW BACK PAIN: Primary | ICD-10-CM

## 2024-12-12 ENCOUNTER — PATIENT OUTREACH (OUTPATIENT)
Dept: CARE COORDINATION | Facility: CLINIC | Age: 24
End: 2024-12-12
Payer: COMMERCIAL

## 2024-12-16 NOTE — TELEPHONE ENCOUNTER
Action December 16, 2024 9:14 AM MT   Action Taken Sent a request for records and imaging from [facility].  Sent a request for imaging from [facility].     Action December 17, 2024 8:05 AM MT   Action Taken Called [facility] for imaging to be pushed STAT.       DIAGNOSIS:   Chronic low back pain [M54.50, G89.29]  - Primary  Pain in sacrum [M53.3]  Pain in the coccyx [M53.3]   APPOINTMENT DATE: 12/17/2024   NOTES STATUS DETAILS   OFFICE NOTE from referring provider [facility] 12/05/2024 - Jet Fierro MD  - [facility]   OFFICE NOTE from other specialist [facility] 08/19/2024 - [facility]   INJECTIONS Imaging: N/A  Report: [facility]:  04/02/2024 - Bilateral L5-S1 Injections   MRI PACS Internal    [facility]:  11/14/2024 - Sacrum/Coccyx    [facility]:  07/15/2024 - SI Joint    [facility]:  11/29/2023 - L Spine     CT SCAN PACS Internal    [facility]:  01/04/2023 - Abd/Pel   XRAYS (IMAGES & REPORTS) PACS [facility]:  06/26/2024 - SI Joint  06/26/2024 -

## 2024-12-17 ENCOUNTER — PRE VISIT (OUTPATIENT)
Dept: ORTHOPEDICS | Facility: CLINIC | Age: 24
End: 2024-12-17

## 2024-12-17 ENCOUNTER — OFFICE VISIT (OUTPATIENT)
Dept: ORTHOPEDICS | Facility: CLINIC | Age: 24
End: 2024-12-17
Payer: COMMERCIAL

## 2024-12-17 VITALS — WEIGHT: 200 LBS | HEIGHT: 72 IN | BODY MASS INDEX: 27.09 KG/M2

## 2024-12-17 DIAGNOSIS — M53.3 PAIN IN SACRUM: ICD-10-CM

## 2024-12-17 DIAGNOSIS — M53.3 PAIN IN THE COCCYX: ICD-10-CM

## 2024-12-17 DIAGNOSIS — G89.29 CHRONIC MIDLINE LOW BACK PAIN WITH BILATERAL SCIATICA: ICD-10-CM

## 2024-12-17 DIAGNOSIS — M54.42 CHRONIC MIDLINE LOW BACK PAIN WITH BILATERAL SCIATICA: ICD-10-CM

## 2024-12-17 DIAGNOSIS — M54.41 CHRONIC MIDLINE LOW BACK PAIN WITH BILATERAL SCIATICA: ICD-10-CM

## 2024-12-17 PROCEDURE — 99204 OFFICE O/P NEW MOD 45 MIN: CPT | Mod: 25 | Performed by: ORTHOPAEDIC SURGERY

## 2024-12-17 PROCEDURE — 20605 DRAIN/INJ JOINT/BURSA W/O US: CPT | Performed by: ORTHOPAEDIC SURGERY

## 2024-12-17 RX ADMIN — TRIAMCINOLONE ACETONIDE 20 MG: 40 INJECTION, SUSPENSION INTRA-ARTICULAR; INTRAMUSCULAR at 14:24

## 2024-12-17 RX ADMIN — LIDOCAINE HYDROCHLORIDE 5 ML: 10 INJECTION, SOLUTION EPIDURAL; INFILTRATION; INTRACAUDAL; PERINEURAL at 14:24

## 2024-12-17 ASSESSMENT — PATIENT HEALTH QUESTIONNAIRE - PHQ9: SUM OF ALL RESPONSES TO PHQ QUESTIONS 1-9: 14

## 2024-12-17 NOTE — PROGRESS NOTES
Large Joint Injection/Arthocentesis    Date/Time: 12/17/2024 2:24 PM    Performed by: Timo Young MD  Authorized by: Timo Young MD    Indications:  Pain  Needle Size:  21 G  Guidance: landmark guided     Location comment:  Tailbone      Medications:  20 mg triamcinolone 40 MG/ML; 5 mL lidocaine (PF) 1 %  Outcome:  Tolerated well, no immediate complications  Procedure discussed: discussed risks, benefits, and alternatives    Consent Given by:  Patient  Timeout: timeout called immediately prior to procedure    Prep: patient was prepped and draped in usual sterile fashion

## 2024-12-17 NOTE — PROGRESS NOTES
Reports 2-3 years of tailbone pain. Worse in the past month. Unsure of there was any inciting trauma, reports slips and falls but no known specific trauma. Slowly worsened over a 2 month period. Has been on and off. Takes advil, improves pain. PT for pelvic floor dysfunction without improvement, chiropractic for low back w/o improvement. Also endorses pain that radiates into low back and into legs. Mostly radiates down back of leg, bilaterally. Endorses having received an injection in low back w/o improvement in pain. Has a desk job, sits most of the day. Unsure what makes the pain worse. Endorses numbness and tingling in anterior thighs. Has been diagnosed with pelvic floor dysfunction, overactive bladder. Has never had an EMG. Notes increasing tightness after 100 feet. Has not sit on a donut for tailbone.        Lumbar Spine:    Appearance - No gross stepoffs or deformities    Motor -     L2-3: Hip flexion 5/5 R and 5/5 L strength          L3/4:  Knee extension R 5/5 and L 5/5 strength         L4/5:  Foot dorsiflexion R 5/5 L 5/5 and       EHL dorsiflexion R 4/5 L 4/5 strength         S1:  Plantarflexion/Peroneal Muscles  R 5/5 and L 5/5 strength    Sensation: intact to light touch L3-S1 distribution BLE      Neurologic:      REFLEXES Left Right   Patella 2+ 2+   Ankle jerk 2+ 2+   Babinski No upgoing great toe No upgoing great toe   Clonus 0 beats 0 beats     Hip Exam:  No pain with hip log roll and no tenderness over the greater trochanters.    Alignment:  Patient stands with a neutral standing sagittal balance.

## 2024-12-17 NOTE — LETTER
12/17/2024      Ortiz Johns  [address] [address]      Dear Colleague,    Thank you for referring your patient, Ortiz Johns, to the [facility] [place]. Please see a copy of my visit note below.    Spine Surgery Consultation    REFERRING PHYSICIAN: Jet Fierro   PRIMARY CARE PHYSICIAN: Clinic - [facility]           Chief Complaint:   Consult (Chronic low back pain referred by Dr. Jet Fierro at [facility])      History of Present Illness:  Symptom Profile Including: location of symptoms, onset, severity, exacerbating/alleviating factors, previous treatments:        Ortiz Johns is a 23 year old male with a history of his anxiety disorder, ADHD, chronic back pain presents with complaints of low back b/l radicular leg pain and numbness. Reports 2-3 years of tailbone pain. Worse in the past month. Unsure of there was any inciting trauma, reports slips and falls but no known specific trauma prior to the onset of pain. Slowly worsened over a 2 month period. Has been on and off. Takes advil, improves pain. PT for pelvic floor dysfunction without improvement, chiropractic for low back w/o improvement. Also endorses pain that radiates into low back and into legs. Mostly radiates down back of leg, bilaterally. Endorses having received an injection in low back w/o improvement in pain. Has a desk job, sits most of the day. Unsure what makes the pain worse. Endorses numbness and tingling in anterior thighs. Has been diagnosed with pelvic floor dysfunction, overactive bladder by neurologists. Has never had an EMG. Notes increasing tightness after ambulating 100 feet. Has not sit on a donut for tailbone. Feels both legs are weak.    Past treatments tried:  - Physical therapy: has tried PT for pelvic floor dysfunction  - Injections: reports heh as had an injection in his lumbar spine w/o improvement in his pain  - Medications: advil         Past Medical History:    No past medical history on file.         Past Surgical History:   No past surgical history on file.         Social History:     Social History     Tobacco Use     Smoking status: Never     Smokeless tobacco: Never   Substance Use Topics     Alcohol use: No            Family History:     Family History   Problem Relation Age of Onset     No Known Problems Mother      Hypertension Father             Allergies:   No Known Allergies         Medications:     Current Outpatient Medications   Medication Sig Dispense Refill     methocarbamol (ROBAXIN) 500 MG tablet Take 2 tablets (1,000 mg) by mouth 3 times daily as needed for muscle spasms (Patient not taking: Reported on 1/26/2024) 20 tablet 0     sertraline (ZOLOFT) 50 MG tablet Take 50 mg by mouth daily (Patient not taking: Reported on 1/26/2024)       vibegron (GEMTESA) 75 MG TABS tablet Take 1 tablet (75 mg) by mouth daily 90 tablet 3     No current facility-administered medications for this visit.             Review of Systems:     A 10 point ROS was performed and reviewed. Specific responses to these questions are noted at the end of the document.         Physical Exam:   Vitals: There were no vitals taken for this visit.  Constitutional: awake, alert, cooperative, no apparent distress, appears stated age.    Eyes: The sclera are white.  Ears, Nose, Throat: The trachea is midline.  Psychiatric: The patient has a normal affect.  Respiratory: breathing non-labored  Cardiovascular: The extremities are warm and perfused.  Skin: no obvious rashes or lesions.  Musculoskeletal, Neurologic, and Spine:    Lumbar Spine:                          Appearance - No gross stepoffs or deformities              Palpation -tender to palpation over lumbar spine paraspinal muscles, bilateral piriformis, coccyx.  Nontender palpation over bilateral SI joints.                          Motor -                           L2-3: Hip flexion 5/5 R and 4+/5 L strength                            L3/4:  Knee extension R 5/5 and L 4+/5 strength                          L4/5:  Foot dorsiflexion R 5/5 L 4+/5 and                                       EHL dorsiflexion R 4/5 L 4/5 strength                          S1:  Plantarflexion/Peroneal Muscles  R 5/5 and L 5/5 strength                          Sensation: intact to light touch L3-S1 distribution BLE                             Neurologic:                            REFLEXES Left Right   Patella 3+ 2+   Ankle jerk 2+ 2+   Clonus 0 beats 0 beats      Hip Exam:  No pain with hip log roll and no tenderness over the greater trochanters.     Alignment:  Patient stands with a neutral standing sagittal balance.            Imaging:   We ordered and independently reviewed new radiographs at this clinic visit. The results were discussed with the patient.  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   Discussed that it is unlikely that the coccyx is the primary source of his bilateral lower extremity radicular symptoms and pelvic floor dysfunction.  However, there are is no evidence of structural pathology on MRI of his lumbar spine, SI joints, or sacrum to provide a cohesive diagnosis.  As such, will plan to try coccyx injections to see if this provides any improvement given his intense pain at the coccyx.  He was also counseled to sit on a donut as needed  to offload his coccyx.  He will reach out via MyChart to discuss whether the injections were helpful.  Discussed that if it is helpful, he can receive these 3-4 times per year.  Discussed that coccygectomy is the surgical option for coxalgia, but would recommend exhausting nonoperative treatment first.  His chiropractor or therapist may manipulate his tailbone.  He will follow-up on an as-needed basis.  The patient is mother expressed understanding and are in agreement with this plan.      Plan:  Coccyx injection  Patient to follow-up via MyChart after obtaining injection  Follow-up as needed    Procedure:  The skin over the coccyx was prepared with chlorhexidine.  We then injected a mixture of lidocaine and Kenalog in a sterile fashion using a 30-gauge needle and a 10 cc syringe.  A Band-Aid was applied.  I personally performed the injection.  There were no immediately evident complications.    MD Kaela Burgos MD  Orthopedic Surgery Resident    Patient was seen and examined with Dr. Young who independently evaluated the patient and agrees with the assessment and exam.     Attending MD (Dr. Timo Young) : I personally performed greater than 50% of the effort related to this patient visit and am responsible for the medical decision making and billing in this case.  I met with the patient, reviewed and verified the history and physical exam of the patient and discussed the patient's management with the other clinical providers involved in this patient's care including any involved residents or physicians assistants. I also personally reviewed the imaging and I personally formulated the treatment plan and diagnosis in their entirety.  I reviewed the above note and agree with the documented findings and plan of care, which were communicated to the patient.      Timo Young MD      Respectfully,  Timo Young MD  Spine Surgery  [facility]  [place]          Depression Screening Follow-up        12/17/2024     1:02 PM   PHQ   PHQ-9 Total Score 14   Q9: Thoughts of better off dead/self-harm past 2 weeks Not at all         12/17/2024     1:02 PM   Last PHQ-9   1.  Little interest or pleasure in doing things 3   2.  Feeling down, depressed, or hopeless 2   3.  Trouble falling or staying asleep, or sleeping too much 3   4.  Feeling tired or having little energy 3   5.  Poor appetite or overeating 3   6.  Feeling bad about yourself 0   7.  Trouble concentrating 0   8.  Moving slowly or restless 0   Q9: Thoughts of better off dead/self-harm past 2 weeks 0   PHQ-9 Total Score 14         Does the patient currently have a mental health provider?  No  Follow Up Actions Taken  Patient to follow up with PCP. Clinic staff to schedule appointment if able.  Patient declined referral. Patient stating his pain is causing low mental health. If pain was taken away he would be a lot better.      Iman Rogers RN        Large Joint Injection/Arthocentesis    Date/Time: 12/17/2024 2:24 PM    Performed by: Timo Young MD  Authorized by: Timo Young MD    Indications:  Pain  Needle Size:  21 G  Guidance: landmark guided     Location comment:  Tailbone      Medications:  20 mg triamcinolone 40 MG/ML; 5 mL lidocaine (PF) 1 %  Outcome:  Tolerated well, no immediate complications  Procedure discussed: discussed risks, benefits, and alternatives    Consent Given by:  Patient  Timeout: timeout called immediately prior to procedure    Prep: patient was prepped and draped in usual sterile fashion          Again, thank you for allowing me to participate in the care of your patient.        Sincerely,        Timo Young MD

## 2024-12-17 NOTE — NURSING NOTE
"Reason For Visit:   Chief Complaint   Patient presents with    Consult     Chronic low back, sacrum, and coccyx pain referred by Dr. Jet Fierro at [facility]. Pt notes pain for a few years        Primary MD: FATEMEH Camacho [facility]  Ref. MD: Jet Fierro    ?  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  Work status?  Part-time.  Date of injury: chronic  Date of surgery: na  Type of surgery: na.  Smoker: No  Request smoking cessation information: No    Ht 1.82 m (5' 11.65\")   Wt 90.7 kg (200 lb)   BMI 27.39 kg/m      Pain Assessment  Patient Currently in Pain: Yes  0-10 Pain Scale: 7  Primary Pain Location: Coccyx (shoots to bilateral legs)    Oswestry (RADHA) Questionnaire        12/17/2024    12:51 PM   OSWESTRY DISABILITY INDEX   Count 10    Sum 20    Oswestry Score (%) 40 %        Patient-reported              -ZACHARY Batista- [facility]    "

## 2024-12-17 NOTE — NURSING NOTE
[facility] [address] [address]  [phone]  Dept: [phone]  ______________________________________________________________________________    Patient: Ortiz Johns   : 2000   MRN: [id number]   2024    INVASIVE PROCEDURE SAFETY CHECKLIST    Date: 2024   Procedure:Tailbone Steroid Injection  Patient Name: Ortiz Johns  MRN: [id number]  YOB: 2000    Action: Complete sections as appropriate. Any discrepancy results in a HARD COPY until resolved.     PRE PROCEDURE:  Patient ID verified with 2 identifiers (name and  or MRN): Yes  Procedure and site verified with patient/designee (when able): Yes  Accurate consent documentation in medical record: Yes  H&P (or appropriate assessment) documented in medical record: Yes  H&P must be up to 20 days prior to procedure and updates within 24 hours of procedure as applicable: Yes  Relevant diagnostic and radiology test results appropriately labeled and displayed as applicable: Yes  Procedure site(s) marked with provider initials: Yes    TIMEOUT:  Time-Out performed immediately prior to starting procedure, including verbal and active participation of all team members addressing the following:Yes  * Correct patient identify  * Confirmed that the correct side and site are marked  * An accurate procedure consent form  * Agreement on the procedure to be done  * Correct patient position  * Relevant images and results are properly labeled and appropriately displayed  * The need to administer antibiotics or fluids for irrigation purposes during the procedure as applicable   * Safety precautions based on patient history or medication use    DURING PROCEDURE: Verification of correct person, site, and procedures any time the responsibility for care of the patient is transferred to another member of the care team.       The following medications were given:          Prior to injection, verified patient identity using patient's name and date of birth.  Due to injection administration, patient instructed to remain in clinic for 15 minutes  afterwards, and to report any adverse reaction to me immediately.    Joint injection was performed.    Medication Name: Kenalog 20mg NDC [address]-7, Lot: [id number] EXP: 07/01/2026  Drug Amount Wasted:  Yes: 20 mg/ml   Vial/Syringe: Single dose vial  Expiration Date:  07/2026    Medication Name Lidocaine 1%  NDC 00364-529-60 Lot [id number] EXP: 08/01/2027    Scribed by Ra Arenas CMA for Dr. Young on December 17, 2024 at 2:28pm based on the provider's statements to me.     Ra Arenas CMA

## 2024-12-17 NOTE — PROGRESS NOTES
Depression Screening Follow-up        12/17/2024     1:02 PM   PHQ   PHQ-9 Total Score 14   Q9: Thoughts of better off dead/self-harm past 2 weeks Not at all         12/17/2024     1:02 PM   Last PHQ-9   1.  Little interest or pleasure in doing things 3   2.  Feeling down, depressed, or hopeless 2   3.  Trouble falling or staying asleep, or sleeping too much 3   4.  Feeling tired or having little energy 3   5.  Poor appetite or overeating 3   6.  Feeling bad about yourself 0   7.  Trouble concentrating 0   8.  Moving slowly or restless 0   Q9: Thoughts of better off dead/self-harm past 2 weeks 0   PHQ-9 Total Score 14         Does the patient currently have a mental health provider?  No  Follow Up Actions Taken  Patient to follow up with PCP. Clinic staff to schedule appointment if able.  Patient declined referral. Patient stating his pain is causing low mental health. If pain was taken away he would be a lot better.      Iman Rogers RN

## 2024-12-17 NOTE — PROGRESS NOTES
Spine Surgery Consultation    REFERRING PHYSICIAN: Jet Fierro   PRIMARY CARE PHYSICIAN: Clinic - [facility]           Chief Complaint:   Consult (Chronic low back pain referred by Dr. Jet Fierro at [facility])      History of Present Illness:  Symptom Profile Including: location of symptoms, onset, severity, exacerbating/alleviating factors, previous treatments:        Ortiz Johns is a 23 year old male with a history of his anxiety disorder, ADHD, chronic back pain presents with complaints of low back b/l radicular leg pain and numbness. Reports 2-3 years of tailbone pain. Worse in the past month. Unsure of there was any inciting trauma, reports slips and falls but no known specific trauma prior to the onset of pain. Slowly worsened over a 2 month period. Has been on and off. Takes advil, improves pain. PT for pelvic floor dysfunction without improvement, chiropractic for low back w/o improvement. Also endorses pain that radiates into low back and into legs. Mostly radiates down back of leg, bilaterally. Endorses having received an injection in low back w/o improvement in pain. Has a desk job, sits most of the day. Unsure what makes the pain worse. Endorses numbness and tingling in anterior thighs. Has been diagnosed with pelvic floor dysfunction, overactive bladder by neurologists. Has never had an EMG. Notes increasing tightness after ambulating 100 feet. Has not sit on a donut for tailbone. Feels both legs are weak.    Past treatments tried:  - Physical therapy: has tried PT for pelvic floor dysfunction  - Injections: reports heh as had an injection in his lumbar spine w/o improvement in his pain  - Medications: advil         Past Medical History:   No past medical history on file.         Past Surgical History:   No past surgical history on file.         Social History:     Social History     Tobacco Use    Smoking status: Never    Smokeless tobacco: Never   Substance Use Topics    Alcohol use:  No            Family History:     Family History   Problem Relation Age of Onset    No Known Problems Mother     Hypertension Father             Allergies:   No Known Allergies         Medications:     Current Outpatient Medications   Medication Sig Dispense Refill    methocarbamol (ROBAXIN) 500 MG tablet Take 2 tablets (1,000 mg) by mouth 3 times daily as needed for muscle spasms (Patient not taking: Reported on 1/26/2024) 20 tablet 0    sertraline (ZOLOFT) 50 MG tablet Take 50 mg by mouth daily (Patient not taking: Reported on 1/26/2024)      vibegron (GEMTESA) 75 MG TABS tablet Take 1 tablet (75 mg) by mouth daily 90 tablet 3     No current facility-administered medications for this visit.             Review of Systems:     A 10 point ROS was performed and reviewed. Specific responses to these questions are noted at the end of the document.         Physical Exam:   Vitals: There were no vitals taken for this visit.  Constitutional: awake, alert, cooperative, no apparent distress, appears stated age.    Eyes: The sclera are white.  Ears, Nose, Throat: The trachea is midline.  Psychiatric: The patient has a normal affect.  Respiratory: breathing non-labored  Cardiovascular: The extremities are warm and perfused.  Skin: no obvious rashes or lesions.  Musculoskeletal, Neurologic, and Spine:    Lumbar Spine:                          Appearance - No gross stepoffs or deformities              Palpation -tender to palpation over lumbar spine paraspinal muscles, bilateral piriformis, coccyx.  Nontender palpation over bilateral SI joints.                          Motor -                           L2-3: Hip flexion 5/5 R and 4+/5 L strength                           L3/4:  Knee extension R 5/5 and L 4+/5 strength                          L4/5:  Foot dorsiflexion R 5/5 L 4+/5 and                                       EHL dorsiflexion R 4/5 L 4/5 strength                          S1:  Plantarflexion/Peroneal Muscles  R 5/5  and L 5/5 strength                          Sensation: intact to light touch L3-S1 distribution BLE                             Neurologic:                            REFLEXES Left Right   Patella 3+ 2+   Ankle jerk 2+ 2+   Clonus 0 beats 0 beats      Hip Exam:  No pain with hip log roll and no tenderness over the greater trochanters.     Alignment:  Patient stands with a neutral standing sagittal balance.            Imaging:   We ordered and independently reviewed new radiographs at this clinic visit. The results were discussed with the patient.  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   Discussed that it is unlikely that the coccyx is the primary source of his bilateral lower extremity radicular symptoms and pelvic floor dysfunction.  However, there are is no evidence of structural pathology on MRI of his lumbar spine, SI joints, or sacrum to provide a cohesive diagnosis.  As such, will plan to try coccyx injections to see if this provides any improvement given his intense pain at the coccyx.  He was also counseled to sit on a donut as needed to offload his coccyx.  He will reach out via [employer] to discuss whether the injections were helpful.  Discussed that if it is helpful, he can receive these 3-4 times per year.  Discussed that coccygectomy is the surgical option for coxalgia, but would  recommend exhausting nonoperative treatment first.  His chiropractor or therapist may manipulate his tailbone.  He will follow-up on an as-needed basis.  The patient is mother expressed understanding and are in agreement with this plan.      Plan:  Coccyx injection  [facility]  Follow-up as needed    Procedure:  The skin over the coccyx was prepared with chlorhexidine.  We then injected a mixture of lidocaine and Kenalog in a sterile fashion using a 30-gauge needle and a 10 cc syringe.  A Band-Aid was applied.  I personally performed the injection.  There were no immediately evident complications.    MD Kaela Burgos MD  Orthopedic Surgery Resident    Patient was seen and examined with Dr. Young who independently evaluated the patient and agrees with the assessment and exam.     Attending MD (Dr. Timo Young) : I personally performed greater than 50% of the effort related to this patient visit and am responsible for the medical decision making and billing in this case.  I met with the patient, reviewed and verified the history and physical exam of the patient and discussed the patient's management with the other clinical providers involved in this patient's care including any involved residents or physicians assistants. I also personally reviewed the imaging and I personally formulated the treatment plan and diagnosis in their entirety.  I reviewed the above note and agree with the documented findings and plan of care, which were communicated to the patient.      Timo Young MD      Respectfully,  Timo Young MD  Spine Surgery  [facility]

## 2024-12-18 RX ORDER — LIDOCAINE HYDROCHLORIDE 10 MG/ML
5 INJECTION, SOLUTION EPIDURAL; INFILTRATION; INTRACAUDAL; PERINEURAL
Status: COMPLETED | OUTPATIENT
Start: 2024-12-17 | End: 2024-12-17

## 2024-12-18 RX ORDER — TRIAMCINOLONE ACETONIDE 40 MG/ML
20 INJECTION, SUSPENSION INTRA-ARTICULAR; INTRAMUSCULAR
Status: COMPLETED | OUTPATIENT
Start: 2024-12-17 | End: 2024-12-17

## 2025-01-05 ENCOUNTER — HEALTH MAINTENANCE LETTER (OUTPATIENT)
Age: 25
End: 2025-01-05

## 2025-01-06 ENCOUNTER — TELEPHONE (OUTPATIENT)
Dept: UROLOGY | Facility: CLINIC | Age: 25
End: 2025-01-06
Payer: COMMERCIAL

## 2025-01-06 NOTE — TELEPHONE ENCOUNTER
Left message for Ortiz regarding cancelling new urology appt on 1/10/25 due to insurance coverage.  Also spoke to patient's mom Zoë informing her that new patient appointment needs to be cancelled due to patient having [employer] insurance coverage which  does not contract with.  Zoë understands and appointment was cancelled.

## (undated) RX ORDER — TRIAMCINOLONE ACETONIDE 40 MG/ML
INJECTION, SUSPENSION INTRA-ARTICULAR; INTRAMUSCULAR
Status: DISPENSED
Start: 2024-12-17

## (undated) RX ORDER — LIDOCAINE HYDROCHLORIDE 10 MG/ML
INJECTION, SOLUTION INFILTRATION; PERINEURAL
Status: DISPENSED
Start: 2024-12-17